# Patient Record
Sex: MALE | Race: WHITE | Employment: OTHER | URBAN - METROPOLITAN AREA
[De-identification: names, ages, dates, MRNs, and addresses within clinical notes are randomized per-mention and may not be internally consistent; named-entity substitution may affect disease eponyms.]

---

## 2019-12-03 ENCOUNTER — HOSPITAL ENCOUNTER (OUTPATIENT)
Dept: RADIATION ONCOLOGY | Age: 84
Discharge: HOME | End: 2019-12-03
Payer: MEDICARE

## 2019-12-03 VITALS
RESPIRATION RATE: 18 BRPM | OXYGEN SATURATION: 91 % | SYSTOLIC BLOOD PRESSURE: 136 MMHG | DIASTOLIC BLOOD PRESSURE: 78 MMHG | WEIGHT: 192 LBS | HEART RATE: 99 BPM | TEMPERATURE: 98.5 F

## 2019-12-03 PROBLEM — I63.9 CEREBROVASCULAR ACCIDENT (CVA) (HCC): Status: ACTIVE | Noted: 2019-09-16

## 2019-12-03 PROBLEM — I48.91 ATRIAL FIBRILLATION (HCC): Status: ACTIVE | Noted: 2019-09-13

## 2019-12-03 PROBLEM — Z79.01 LONG TERM (CURRENT) USE OF ANTICOAGULANTS: Status: ACTIVE | Noted: 2019-09-13

## 2019-12-03 PROBLEM — R73.01 IMPAIRED FASTING GLUCOSE: Status: ACTIVE | Noted: 2019-06-20

## 2019-12-03 PROBLEM — N47.8 REDUNDANT PREPUCE AND PHIMOSIS: Status: ACTIVE | Noted: 2019-12-03

## 2019-12-03 PROBLEM — C85.80: Status: ACTIVE | Noted: 2019-10-07

## 2019-12-03 PROBLEM — B35.4 DERMATOPHYTOSIS, BODY: Status: ACTIVE | Noted: 2019-12-03

## 2019-12-03 PROBLEM — R97.20 ELEVATED PSA: Status: ACTIVE | Noted: 2017-03-27

## 2019-12-03 PROBLEM — L57.0 ACTINIC KERATOSIS: Status: ACTIVE | Noted: 2019-12-03

## 2019-12-03 PROBLEM — N28.9 RENAL IMPAIRMENT: Status: ACTIVE | Noted: 2018-06-18

## 2019-12-03 PROBLEM — E78.2 MIXED HYPERLIPIDEMIA: Status: ACTIVE | Noted: 2017-03-06

## 2019-12-03 PROBLEM — N47.1 REDUNDANT PREPUCE AND PHIMOSIS: Status: ACTIVE | Noted: 2019-12-03

## 2019-12-03 PROBLEM — I10 BENIGN ESSENTIAL HYPERTENSION: Status: ACTIVE | Noted: 2019-12-03

## 2019-12-03 PROBLEM — R47.01 APHASIA: Status: ACTIVE | Noted: 2019-09-22

## 2019-12-03 PROBLEM — D64.9 ANEMIA: Status: ACTIVE | Noted: 2019-06-20

## 2019-12-03 PROBLEM — N47.1 PHIMOSIS: Status: ACTIVE | Noted: 2017-03-27

## 2019-12-03 PROBLEM — Z95.3 S/P TAVR (TRANSCATHETER AORTIC VALVE REPLACEMENT), BIOPROSTHETIC: Status: ACTIVE | Noted: 2019-10-14

## 2019-12-03 PROBLEM — Z85.828 HISTORY OF SQUAMOUS CELL CARCINOMA OF SKIN: Status: ACTIVE | Noted: 2018-06-18

## 2019-12-03 PROCEDURE — 99213 OFFICE O/P EST LOW 20 MIN: CPT | Performed by: RADIOLOGY

## 2019-12-03 RX ORDER — NIACIN 1000 MG/1
1000 TABLET, EXTENDED RELEASE ORAL DAILY
Refills: 3 | COMMUNITY
Start: 2019-11-24

## 2019-12-03 RX ORDER — ESCITALOPRAM OXALATE 10 MG/1
10 TABLET ORAL DAILY
COMMUNITY

## 2019-12-03 RX ORDER — ATORVASTATIN CALCIUM 40 MG/1
40 TABLET, FILM COATED ORAL DAILY
Refills: 2 | COMMUNITY
Start: 2019-11-24

## 2019-12-16 ENCOUNTER — HOSPITAL ENCOUNTER (OUTPATIENT)
Dept: RADIATION ONCOLOGY | Age: 84
Discharge: HOME | End: 2019-12-16
Attending: RADIOLOGY
Payer: MEDICARE

## 2019-12-16 DIAGNOSIS — C85.80: Primary | ICD-10-CM

## 2019-12-16 LAB
BUN BLDV-MCNC: 31 MG/DL (ref 8–23)
CREAT SERPL-MCNC: 1.2 MG/DL (ref 0.7–1.2)
GFR AFRICAN AMERICAN: >60 ML/MIN
GFR NON-AFRICAN AMERICAN: 58 ML/MIN
GFR SERPL CREATININE-BSD FRML MDRD: ABNORMAL ML/MIN/{1.73_M2}
GFR SERPL CREATININE-BSD FRML MDRD: ABNORMAL ML/MIN/{1.73_M2}

## 2019-12-16 PROCEDURE — 36415 COLL VENOUS BLD VENIPUNCTURE: CPT

## 2019-12-16 PROCEDURE — 84520 ASSAY OF UREA NITROGEN: CPT

## 2019-12-16 PROCEDURE — 82565 ASSAY OF CREATININE: CPT

## 2019-12-16 PROCEDURE — 77334 RADIATION TREATMENT AID(S): CPT | Performed by: RADIOLOGY

## 2019-12-20 ENCOUNTER — HOSPITAL ENCOUNTER (OUTPATIENT)
Dept: RADIATION ONCOLOGY | Age: 84
Discharge: HOME | End: 2019-12-20
Attending: RADIOLOGY
Payer: MEDICARE

## 2019-12-20 PROCEDURE — 77301 RADIOTHERAPY DOSE PLAN IMRT: CPT | Performed by: RADIOLOGY

## 2019-12-20 PROCEDURE — 77300 RADIATION THERAPY DOSE PLAN: CPT | Performed by: RADIOLOGY

## 2019-12-20 PROCEDURE — 77338 DESIGN MLC DEVICE FOR IMRT: CPT | Performed by: RADIOLOGY

## 2019-12-30 ENCOUNTER — HOSPITAL ENCOUNTER (OUTPATIENT)
Dept: RADIATION ONCOLOGY | Age: 84
Discharge: HOME | End: 2019-12-30
Attending: RADIOLOGY
Payer: MEDICARE

## 2019-12-30 ENCOUNTER — APPOINTMENT (OUTPATIENT)
Dept: RADIATION ONCOLOGY | Age: 84
End: 2019-12-30
Attending: RADIOLOGY
Payer: MEDICARE

## 2019-12-30 PROCEDURE — 77386 HC NTSTY MODUL RAD TX DLVR CPLX: CPT | Performed by: RADIOLOGY

## 2019-12-31 ENCOUNTER — HOSPITAL ENCOUNTER (OUTPATIENT)
Dept: RADIATION ONCOLOGY | Age: 84
Discharge: HOME | End: 2019-12-31
Payer: MEDICARE

## 2019-12-31 ENCOUNTER — HOSPITAL ENCOUNTER (OUTPATIENT)
Dept: RADIATION ONCOLOGY | Age: 84
Discharge: HOME | End: 2019-12-31
Attending: RADIOLOGY
Payer: MEDICARE

## 2019-12-31 VITALS
TEMPERATURE: 97.4 F | HEART RATE: 55 BPM | WEIGHT: 193.8 LBS | DIASTOLIC BLOOD PRESSURE: 77 MMHG | SYSTOLIC BLOOD PRESSURE: 130 MMHG | RESPIRATION RATE: 16 BRPM

## 2019-12-31 PROCEDURE — 77386 HC NTSTY MODUL RAD TX DLVR CPLX: CPT | Performed by: RADIOLOGY

## 2019-12-31 NOTE — PROGRESS NOTES
[address]            Radiation Oncology          [address] [facility]          [place] [address], [address]        Sumaya Quintana: [phone]        F: [phone]       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 RADIATION THERAPY COURSE:   Treatment Site: Left neck   Actual Dose: 400 cGy  Total Planned Dose: 2400 cGy  Treatment technique: IMRT  Therapy imaging monitoring: CBCT daily  Concurrent Chemotherapy: None    SUBJECTIVE:   Patient is doing well. Denies dysphagia, xerostomia, changes in taste, pain with swallowing. OBJECTIVE:   ECO Symptomatic, <50% in bed during the day    VITAL SIGNS: There were no vitals taken for this visit. Wt Readings from Last 5 Encounters:   19 192 lb (87.1 kg)     GENERAL:  General appearance is that of a well-nourished, well-developed in no apparent distress. SKIN: No erythema or desquamation. MEDICATIONS:    Current Outpatient Medications:     apixaban (ELIQUIS) 5 MG TABS tablet, Take 5 mg by mouth daily, Disp: , Rfl:     atorvastatin (LIPITOR) 40 MG tablet, Take 40 mg by mouth daily, Disp: , Rfl: 2    escitalopram (LEXAPRO) 10 MG tablet, Take 10 mg by mouth daily, Disp: , Rfl:     metoprolol tartrate (LOPRESSOR) 25 MG tablet, Take 25 mg by mouth daily, Disp: , Rfl: 0    niacin (NIASPAN) 1000 MG extended release tablet, Take 1,000 mg by mouth daily, Disp: , Rfl: 3    Multiple Vitamins-Minerals (CENTRUM ADULTS PO), Take 1 tablet by mouth daily, Disp: , Rfl:       ASSESSMENT PLAN:   Treatment setup and plan reviewed. Port images/CBCT images reviewed. Appropriate laboratory work was reviewed.  Treatment side

## 2019-12-31 NOTE — PROGRESS NOTES
Luciano Mcwilliams  12/31/2019  Wt Readings from Last 3 Encounters:   12/31/19 193 lb 12.8 oz (87.9 kg)   12/03/19 192 lb (87.1 kg)     There is no height or weight on file to calculate BMI. Treatment Area: Head and neck     Patient was seen today for weekly visit. Comfort Alteration  Fatigue: Mild    Mucous Membrane Alteration  Mucositis Due to Radiation: No  Thrush: No  Voice Changes: No    Nutritional Alteration  Anorexia: poor but normal for pt  Nausea: No   Vomiting: No     Ventilation Alteration  Cough:No    Skin Alteration   Sensation: No concerns     Radiation Dermatitis:  Intact [x]     Erythema  []     Discoloration  []     Rash []     Dry desquamation  []     Moist desquamation []       Emotional  Coping: effective      Injury, potential bleeding or infection:  Skin care reviewed     No results found for: WBC, PLT      /77   Pulse 55   Temp 97.4 °F (36.3 °C) (Oral)   Resp 16   Wt 193 lb 12.8 oz (87.9 kg)   Patient Currently in Pain: No               Assessment/Plan: Patient was seen today for weekly visit. Pt recently started treatment. No concerns or issues today. Dr. Tiffany Stauffer updated and examined pt. He will continue with treatment as planned.      Belkis Cowan

## 2020-01-01 ENCOUNTER — APPOINTMENT (OUTPATIENT)
Dept: RADIATION ONCOLOGY | Age: 85
End: 2020-01-01
Attending: RADIOLOGY
Payer: MEDICARE

## 2020-01-02 ENCOUNTER — HOSPITAL ENCOUNTER (OUTPATIENT)
Dept: RADIATION ONCOLOGY | Age: 85
Discharge: HOME | End: 2020-01-02
Attending: RADIOLOGY
Payer: MEDICARE

## 2020-01-02 PROCEDURE — 77386 HC NTSTY MODUL RAD TX DLVR CPLX: CPT | Performed by: RADIOLOGY

## 2020-01-03 ENCOUNTER — HOSPITAL ENCOUNTER (OUTPATIENT)
Dept: RADIATION ONCOLOGY | Age: 85
Discharge: HOME | End: 2020-01-03
Attending: RADIOLOGY
Payer: MEDICARE

## 2020-01-03 PROCEDURE — 77386 HC NTSTY MODUL RAD TX DLVR CPLX: CPT | Performed by: RADIOLOGY

## 2020-01-06 ENCOUNTER — HOSPITAL ENCOUNTER (OUTPATIENT)
Dept: RADIATION ONCOLOGY | Age: 85
Discharge: HOME | End: 2020-01-06
Attending: RADIOLOGY
Payer: MEDICARE

## 2020-01-06 PROCEDURE — 77386 HC NTSTY MODUL RAD TX DLVR CPLX: CPT | Performed by: RADIOLOGY

## 2020-01-07 ENCOUNTER — HOSPITAL ENCOUNTER (OUTPATIENT)
Dept: RADIATION ONCOLOGY | Age: 85
Discharge: HOME | End: 2020-01-07
Attending: RADIOLOGY
Payer: MEDICARE

## 2020-01-07 PROCEDURE — 77386 HC NTSTY MODUL RAD TX DLVR CPLX: CPT | Performed by: RADIOLOGY

## 2020-01-08 ENCOUNTER — HOSPITAL ENCOUNTER (OUTPATIENT)
Dept: RADIATION ONCOLOGY | Age: 85
Discharge: HOME | End: 2020-01-08
Attending: RADIOLOGY
Payer: MEDICARE

## 2020-01-08 VITALS
TEMPERATURE: 98 F | WEIGHT: 194 LBS | HEART RATE: 58 BPM | SYSTOLIC BLOOD PRESSURE: 163 MMHG | RESPIRATION RATE: 18 BRPM | DIASTOLIC BLOOD PRESSURE: 68 MMHG | OXYGEN SATURATION: 98 %

## 2020-01-08 PROCEDURE — 77386 HC NTSTY MODUL RAD TX DLVR CPLX: CPT | Performed by: RADIOLOGY

## 2020-01-08 PROCEDURE — 77336 RADIATION PHYSICS CONSULT: CPT | Performed by: RADIOLOGY

## 2020-01-08 NOTE — PROGRESS NOTES
[facility]            Radiation Oncology          [address] [facility]          [address], [address]        Joanie Moreno: [phone]        F: [phone]       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     RADIATION THERAPY COURSE:   Treatment Site: Left neck   Actual Dose: 1400 cGy  Total Planned Dose: 2400 cGy  Treatment technique: IMRT  Therapy imaging monitoring: CBCT daily  Concurrent Chemotherapy: None     SUBJECTIVE:   Patient is doing well. Denies dysphagia, xerostomia, changes in taste, pain with swallowing. OBJECTIVE:   ECO Asymptomatic    VITAL SIGNS: BP (!) 163/68   Pulse 58   Temp 98 °F (36.7 °C) (Oral)   Resp 18   Wt 194 lb (88 kg)   SpO2 98%   Wt Readings from Last 5 Encounters:   20 194 lb (88 kg)   19 193 lb 12.8 oz (87.9 kg)   19 192 lb (87.1 kg)     GENERAL:  General appearance is that of a well-nourished, well-developed in no apparent distress. SKIN: No erythema or desquamation.     LABS:  No results found for: WBC, NEUTROABS, HGB, PLT  No results found for: , CEA  No results found for: PSA    MEDICATIONS:    Current Outpatient Medications:     apixaban (ELIQUIS) 5 MG TABS tablet, Take 5 mg by mouth daily, Disp: , Rfl:     atorvastatin (LIPITOR) 40 MG tablet, Take 40 mg by mouth daily, Disp: , Rfl: 2    escitalopram (LEXAPRO) 10 MG tablet, Take 10 mg by mouth daily, Disp: , Rfl:     metoprolol tartrate (LOPRESSOR) 25 MG tablet, Take 25 mg by mouth daily, Disp: , Rfl: 0    niacin (NIASPAN) 1000 MG extended release tablet, Take 1,000 mg by mouth daily, Disp: , Rfl: 3    Multiple Vitamins-Minerals (CENTRUM

## 2020-01-08 NOTE — PROGRESS NOTES
Jerica De Leon  1/8/2020  Wt Readings from Last 3 Encounters:   01/08/20 194 lb (88 kg)   12/31/19 193 lb 12.8 oz (87.9 kg)   12/03/19 192 lb (87.1 kg)     There is no height or weight on file to calculate BMI. Treatment Area: neck     Patient was seen today for weekly visit. Comfort Alteration  Fatigue: Mild    Mucous Membrane Alteration  Mucositis Due to Radiation: No  Thrush: No  Voice Changes: No    Nutritional Alteration  Anorexia: Yes   Nausea: No   Vomiting: No     Ventilation Alteration  Cough:No    Skin Alteration   Sensation:intact     Radiation Dermatitis:  Intact [x]     Erythema  []     Discoloration  []     Rash []     Dry desquamation  []     Moist desquamation []       Emotional  Coping: effective      Injury, potential bleeding or infection: n/a     No results found for: WBC, PLT      BP (!) 163/68   Pulse 58   Temp 98 °F (36.7 °C) (Oral)   Resp 18   Wt 194 lb (88 kg)   SpO2 98%   Patient Currently in Pain: Denies               Assessment/Plan: Patient was seen today for weekly visit. Dr Vipul Jefferson notified and examined pt. No new orders received.        Pati Fuller

## 2020-01-09 ENCOUNTER — HOSPITAL ENCOUNTER (OUTPATIENT)
Dept: RADIATION ONCOLOGY | Age: 85
Discharge: HOME | End: 2020-01-09
Attending: RADIOLOGY
Payer: MEDICARE

## 2020-01-09 PROCEDURE — 77386 HC NTSTY MODUL RAD TX DLVR CPLX: CPT | Performed by: RADIOLOGY

## 2020-01-10 ENCOUNTER — HOSPITAL ENCOUNTER (OUTPATIENT)
Dept: RADIATION ONCOLOGY | Age: 85
Discharge: HOME | End: 2020-01-10
Attending: RADIOLOGY
Payer: MEDICARE

## 2020-01-10 PROCEDURE — 77386 HC NTSTY MODUL RAD TX DLVR CPLX: CPT | Performed by: RADIOLOGY

## 2020-01-13 ENCOUNTER — HOSPITAL ENCOUNTER (OUTPATIENT)
Dept: RADIATION ONCOLOGY | Age: 85
Discharge: HOME | End: 2020-01-13
Attending: RADIOLOGY
Payer: MEDICARE

## 2020-01-13 PROCEDURE — 77386 HC NTSTY MODUL RAD TX DLVR CPLX: CPT | Performed by: RADIOLOGY

## 2020-01-14 ENCOUNTER — HOSPITAL ENCOUNTER (OUTPATIENT)
Dept: RADIATION ONCOLOGY | Age: 85
Discharge: HOME | End: 2020-01-14
Attending: RADIOLOGY
Payer: MEDICARE

## 2020-01-14 PROCEDURE — 77386 HC NTSTY MODUL RAD TX DLVR CPLX: CPT | Performed by: RADIOLOGY

## 2020-01-15 ENCOUNTER — HOSPITAL ENCOUNTER (OUTPATIENT)
Dept: RADIATION ONCOLOGY | Age: 85
Discharge: HOME | End: 2020-01-15
Attending: RADIOLOGY
Payer: MEDICARE

## 2020-01-15 VITALS
SYSTOLIC BLOOD PRESSURE: 146 MMHG | HEART RATE: 78 BPM | DIASTOLIC BLOOD PRESSURE: 75 MMHG | WEIGHT: 192 LBS | OXYGEN SATURATION: 98 % | RESPIRATION RATE: 16 BRPM | TEMPERATURE: 98.6 F

## 2020-01-15 LAB — PROSTATE SPECIFIC ANTIGEN: 43.68 UG/L

## 2020-01-15 PROCEDURE — 77386 HC NTSTY MODUL RAD TX DLVR CPLX: CPT | Performed by: RADIOLOGY

## 2020-01-15 PROCEDURE — 36415 COLL VENOUS BLD VENIPUNCTURE: CPT

## 2020-01-15 PROCEDURE — 77336 RADIATION PHYSICS CONSULT: CPT | Performed by: RADIOLOGY

## 2020-01-15 PROCEDURE — 84153 ASSAY OF PSA TOTAL: CPT

## 2020-01-15 NOTE — PROGRESS NOTES
[address]            Radiation Oncology          [address] [facility]          [address], [address]        Jasmin [phone]        F: [phone]       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     RADIATION THERAPY COURSE:   Treatment Site: Left neck   Actual Dose: 2400 cGy  Total Planned Dose: 2400 cGy  Treatment technique: IMRT  Therapy imaging monitoring: CBCT daily  Concurrent Chemotherapy: None     SUBJECTIVE:   Patient reports pain with swallowing. Denies xerostomia or change in taste. No other complaints. OBJECTIVE:   ECO Symptomatic but completely ambulatory    VITAL SIGNS: BP (!) 146/75   Pulse 78   Temp 98.6 °F (37 °C) (Oral)   Resp 16   Wt 192 lb (87.1 kg)   SpO2 98%   Wt Readings from Last 5 Encounters:   01/15/20 192 lb (87.1 kg)   20 194 lb (88 kg)   19 193 lb 12.8 oz (87.9 kg)   19 192 lb (87.1 kg)     GENERAL:  General appearance is that of a well-nourished, well-developed in no apparent distress. HEENT: Normocephalic, atraumatic, EOMI, moist mucosa, no erythema  SKIN: No erythema or desquamation.     MEDICATIONS:    Current Outpatient Medications:     Magic Mouthwash (MIRACLE MOUTHWASH), Swish and swallow 5 mLs 4 times daily as needed for Irritation or Pain, Disp: , Rfl:     apixaban (ELIQUIS) 5 MG TABS tablet, Take 5 mg by mouth daily, Disp: , Rfl:     atorvastatin (LIPITOR) 40 MG tablet, Take 40 mg by mouth daily, Disp: , Rfl: 2    escitalopram (LEXAPRO) 10 MG tablet, Take 10 mg by mouth daily, Disp: , Rfl:     metoprolol tartrate (LOPRESSOR) 25 MG tablet, Take 25 mg by mouth daily, Disp: , Rfl: 0  

## 2020-01-15 NOTE — PROGRESS NOTES
Jun Marlyn  1/15/2020  Wt Readings from Last 3 Encounters:   01/15/20 192 lb (87.1 kg)   01/08/20 194 lb (88 kg)   12/31/19 193 lb 12.8 oz (87.9 kg)     There is no height or weight on file to calculate BMI. Treatment Area:Right neck     Patient was seen today for weekly visit. Comfort Alteration  Fatigue: Mild    Mucous Membrane Alteration  Mucositis Due to Radiation: No  Thrush: No  Voice Changes: No    Nutritional Alteration  Anorexia: No   Nausea: No   Vomiting: No     Ventilation Alteration  Cough:No    Skin Alteration   Sensation:intact     Radiation Dermatitis:  Intact []     Erythema  [x]     Discoloration  []     Rash []     Dry desquamation  []     Moist desquamation []       Emotional  Coping: effective      Injury, potential bleeding or infection: oral care reviewed     No results found for: WBC, PLT      BP (!) 146/75   Pulse 78   Temp 98.6 °F (37 °C) (Oral)   Resp 16   Wt 192 lb (87.1 kg)   SpO2 98%   Patient Currently in Pain: Yes               Assessment/Plan: Patient was seen today for weekly visit. He reports pain with swallowing. Dr Mahad Almanzar notified and examined pt. Magic Mouthwash called into [facility] in [place].  Pt will f/u in 1 month        Bakari Mcneil

## 2020-01-17 ENCOUNTER — CLINICAL DOCUMENTATION (OUTPATIENT)
Dept: RADIATION ONCOLOGY | Age: 85
End: 2020-01-17

## 2020-01-17 NOTE — PROGRESS NOTES
evaluation. Patient will come back to see me for his first posttreatment follow-up in 1 month.       Electronically signed by Carlos Patel MD on 1/17/2020 at 8:15 AM

## 2020-02-25 ENCOUNTER — HOSPITAL ENCOUNTER (OUTPATIENT)
Dept: RADIATION ONCOLOGY | Age: 85
Discharge: HOME | End: 2020-02-25
Attending: RADIOLOGY
Payer: MEDICARE

## 2020-02-25 VITALS
DIASTOLIC BLOOD PRESSURE: 75 MMHG | SYSTOLIC BLOOD PRESSURE: 152 MMHG | OXYGEN SATURATION: 98 % | TEMPERATURE: 98 F | HEART RATE: 60 BPM | RESPIRATION RATE: 18 BRPM | WEIGHT: 200 LBS

## 2020-02-25 PROCEDURE — 99212 OFFICE O/P EST SF 10 MIN: CPT | Performed by: RADIOLOGY

## 2020-02-25 NOTE — PROGRESS NOTES
Mary Lou Chiu  2/25/2020  3:07 PM      Vitals:    02/25/20 1500   BP: (!) 152/75   Pulse: 60   Resp: 18   Temp: 98 °F (36.7 °C)   SpO2: 98%    :  Patient Currently in Pain: Denies             Wt Readings from Last 1 Encounters:   02/25/20 200 lb (90.7 kg)                Current Outpatient Medications:     Magic Mouthwash (MIRACLE MOUTHWASH), Swish and swallow 5 mLs 4 times daily as needed for Irritation or Pain, Disp: , Rfl:     apixaban (ELIQUIS) 5 MG TABS tablet, Take 5 mg by mouth daily, Disp: , Rfl:     atorvastatin (LIPITOR) 40 MG tablet, Take 40 mg by mouth daily, Disp: , Rfl: 2    escitalopram (LEXAPRO) 10 MG tablet, Take 10 mg by mouth daily, Disp: , Rfl:     metoprolol tartrate (LOPRESSOR) 25 MG tablet, Take 25 mg by mouth daily, Disp: , Rfl: 0    niacin (NIASPAN) 1000 MG extended release tablet, Take 1,000 mg by mouth daily, Disp: , Rfl: 3    Multiple Vitamins-Minerals (CENTRUM ADULTS PO), Take 1 tablet by mouth daily, Disp: , Rfl:     Immunizations:    Influenza status:    [x]   Current   []   Patient declined    Pneumococcal status:  [x]   Current  []   Patient declined    Smoking Status:    [] Smoker - PPD:  Smoking cessation education: Provided []   Declined []    [] Nonsmoker - Quit Date:               [x] Never a smoker           Cancer Screening:  Colonoscopy [] Current [x] Not current   [] Not current, but scheduled   [] NA  Mammogram [] Current [x] Not current   [] Not current, but scheduled   [] NA  Prostate [x] Current [] Not current   [] Not current, but scheduled   [] NA  PAP/Pelvic [] Current [] Not current   [] Not current, but scheduled   [x] NA  Skin  [] Current  [x] Not current   [] Not current, but scheduled   [] NA    Hormone:  Lupron []   Last dose given:           Next dose due:   Eligard []   Last dose given:           Next dose due:   Aromatase Inhibitors []   Medication name:   N/A:  [x]         FALLS RISK SCREEN  Instructions:  Assess the patient and enter the appropriate indicators that are present for fall risk identification. Total the numbers entered and assign a fall risk score from Table 2.  Reassess patient at a minimum every 12 weeks or with status change. Assessment   Date  2/25/2020     1. Mental Ability: confusion/cognitively impaired 0     2. Elimination Issues: incontinence, frequency 0       3. Ambulatory: use of assistive devices (walker, cane, off-loading devices),        attached to equipment (IV pole, oxygen) 0     4. Sensory Limitations: dizziness, vertigo, impaired vision 0     5. Age less than 65        0     6. Age 72 or greater 1     7. Medication: diuretics, strong analgesics, hypnotics, sedatives,        antihypertensive agents 3   8. Falls:  recent history of falls within the last 3 months (not to include slipping or        tripping) 0   TOTAL 4    If score of 4 or greater was education given? Yes           TABLE 2   Risk Score Risk Level Plan of Care   0-3 Little or  No Risk 1. Provide assistance as indicated for ambulation activities  2. Reorient confused/cognitively impaired patient  3. Chair/bed in low position, stretcher/bed with siderails up except when performing patient care activities  5. Educate patient/family/caregiver on falls prevention  6.  Reassess in 12 weeks or with any noted change in patient condition which places them at a risk for a fall   4-6 Moderate Risk 1. Provide assistance as indicated for ambulation activities  2. Reorient confused/cognitively impaired patient  3. Chair/bed in low position, stretcher/bed with siderails up except when performing patient care activities  4. Educate patient/family/caregiver on falls prevention     7 or   Higher High Risk 1. Place patient in easily observable treatment room  2. Patient attended at all times by family member or staff  3. Provide assistance as indicated for ambulation activities  4. Reorient confused/cognitively impaired patient  5.   Chair/bed in low position, stretcher/bed with siderails up except when performing patient care activities  6. Educate patient/family/caregiver on falls prevention         PLAN: Patient is seen today in follow up. He reports no concerns at this time. Dr Kathryn Courtney notified and examined pt. Pt has decided not to f/u with urology at this time. Pt to f/u in RO in 3 months.          John Paul Felix

## 2020-03-07 ENCOUNTER — HOSPITAL ENCOUNTER (EMERGENCY)
Age: 85
Discharge: HOME | End: 2020-03-07
Attending: EMERGENCY MEDICINE
Payer: MEDICARE

## 2020-03-07 ENCOUNTER — APPOINTMENT (OUTPATIENT)
Dept: CT IMAGING | Age: 85
End: 2020-03-07
Payer: MEDICARE

## 2020-03-07 VITALS
OXYGEN SATURATION: 99 % | SYSTOLIC BLOOD PRESSURE: 129 MMHG | WEIGHT: 194 LBS | HEIGHT: 70 IN | DIASTOLIC BLOOD PRESSURE: 59 MMHG | BODY MASS INDEX: 27.77 KG/M2 | RESPIRATION RATE: 14 BRPM | TEMPERATURE: 97.7 F | HEART RATE: 55 BPM

## 2020-03-07 PROCEDURE — 90471 IMMUNIZATION ADMIN: CPT | Performed by: EMERGENCY MEDICINE

## 2020-03-07 PROCEDURE — 72125 CT NECK SPINE W/O DYE: CPT

## 2020-03-07 PROCEDURE — 70450 CT HEAD/BRAIN W/O DYE: CPT

## 2020-03-07 PROCEDURE — 99283 EMERGENCY DEPT VISIT LOW MDM: CPT

## 2020-03-07 PROCEDURE — 6370000000 HC RX 637 (ALT 250 FOR IP): Performed by: EMERGENCY MEDICINE

## 2020-03-07 PROCEDURE — 12011 RPR F/E/E/N/L/M 2.5 CM/<: CPT

## 2020-03-07 PROCEDURE — 90715 TDAP VACCINE 7 YRS/> IM: CPT | Performed by: EMERGENCY MEDICINE

## 2020-03-07 PROCEDURE — 6360000002 HC RX W HCPCS: Performed by: EMERGENCY MEDICINE

## 2020-03-07 RX ORDER — CEPHALEXIN 500 MG/1
500 CAPSULE ORAL 4 TIMES DAILY
Qty: 28 CAPSULE | Refills: 0 | Status: SHIPPED | OUTPATIENT
Start: 2020-03-07 | End: 2020-03-14

## 2020-03-07 RX ADMIN — SILVER NITRATE APPLICATORS 1 EACH: 25; 75 STICK TOPICAL at 10:49

## 2020-03-07 RX ADMIN — TETANUS TOXOID, REDUCED DIPHTHERIA TOXOID AND ACELLULAR PERTUSSIS VACCINE, ADSORBED 0.5 ML: 5; 2.5; 8; 8; 2.5 SUSPENSION INTRAMUSCULAR at 11:22

## 2020-03-07 NOTE — ED PROVIDER NOTES
[address] [facility]  [address] [facility] [address] [facility] [address]  Phone: [phone]  Fax: [phone]        Pt Name: Jun Cline  MRN: [id number]  [place] 1935  Date of evaluation: 3/7/20      CHIEF COMPLAINT     Chief Complaint   Patient presents with    Laceration     right ear laceration that won't quit bleeding, on blood thinners         HISTORY OF PRESENT ILLNESS  (Location/Symptom, Timing/Onset, Context/Setting, Quality, Duration, Modifying Factors, Severity.)    Jun Cline is a 81 y.o. male who presents with a right ear laceration. Patient was going out into the garage to play some garbage away when he accidentally struck his right ear causing a laceration to the outer aspect of the right ear no loss of consciousness does have bleeding and he is on blood thinners denies any overt headache or neck pain he does have some expressive aphasia at times secondary to a stroke that he had suffered previously last tetanus is unknown      REVIEW OF SYSTEMS    (2-9 systems for level 4, 10 or more for level 5)     Review of Systems   Skin: Positive for wound. All other systems reviewed and are negative. PAST MEDICAL HISTORY    has a past medical history of Heart attack ([address] High cholesterol, Hypertension, and Stroke ([address] SURGICAL HISTORY      has a past surgical history that includes Arterial bypass surgry (2004); Cardiac valve replacement (09/2019); Cardiac catheterization (2003); and Mouth Biopsy (09/2019).     CURRENTMEDICATIONS       Previous Medications    APIXABAN (ELIQUIS) 5 MG TABS TABLET    Take 5 mg by mouth daily    ATORVASTATIN (LIPITOR) 40 MG TABLET    Take 40 mg by mouth daily    ESCITALOPRAM (LEXAPRO) 10 MG TABLET    Take 10 mg by mouth daily    MAGIC MOUTHWASH (MIRACLE MOUTHWASH)    Swish and swallow 5 mLs 4 times daily as needed for Irritation or Pain    METOPROLOL TARTRATE (LOPRESSOR) 25 MG TABLET    Take 25 mg by mouth daily    MULTIPLE VITAMINS-MINERALS (CENTRUM ADULTS PO)    Take 1 tablet by mouth daily    NIACIN (NIASPAN) 1000 MG EXTENDED RELEASE TABLET    Take 1,000 mg by mouth daily       ALLERGIES     has No Known Allergies. FAMILY HISTORY     He indicated that the status of his mother is unknown. He reported the following about his father: \"jaw tumor\" . family history includes Colon Cancer in his mother; Other in his father. SOCIAL HISTORY      reports that he has never smoked. He has never used smokeless tobacco. He reports previous alcohol use. He reports that he does not use drugs. PHYSICAL EXAM    (up to 7 for level 4, 8 or more for level 5)   INITIAL VITALS:  height is 5' 10\" (1.778 m) and weight is 88 kg (194 lb). His oral temperature is 97.7 °F (36.5 °C). His blood pressure is 129/59 (abnormal) and his pulse is 55. His respiration is 14 and oxygen saturation is 99%. Physical Exam  Vitals signs and nursing note reviewed. Constitutional:       Appearance: Normal appearance. HENT:      Head:      Comments: Laceration to the right outer ear does have some active oozing of blood     Right Ear: Tympanic membrane normal.      Left Ear: Tympanic membrane normal.   Eyes:      Pupils: Pupils are equal, round, and reactive to light. Neck:      Musculoskeletal: Normal range of motion and neck supple. Musculoskeletal: Normal range of motion. Skin:     Comments: Less than 1 cm laceration to the outer aspect of the right ear   Neurological:      General: No focal deficit present. Mental Status: He is alert.       Comments: GCS of 15 with no focal deficits appreciated         DIFFERENTIAL DIAGNOSIS/ MDM:     The patient presents with a laceration to the right outer ear I did discuss the risks and benefits of a CT of the head and CT of the C-spine given the injury and the family would like to proceed with the CT scans so we will get a CT head CT C-spine update the patient's tetanus    DIAGNOSTIC RESULTS         RADIOLOGY:  Non-plain film images such as CT, Ultrasound and MRI are read by the radiologist. Plain radiographic images are visualized and the radiologist interpretations are reviewed as follows:         Interpretation per the Radiologist below, if available at the time of this note:    CT Cervical Spine WO Contrast (Final result)   Result time 03/07/20 13:03:52   Final result by Shama Chris MD (03/07/20 13:03:52)                Impression:    1. No acute findings in the cervical spine. 2. Severe cervical spine degenerative changes. Narrative:    EXAMINATION:  CT OF THE CERVICAL SPINE WITHOUT CONTRAST    3/7/2020 11:32 am    TECHNIQUE:  CT of the cervical spine was performed without the administration of  intravenous contrast. Multiplanar reformatted images are provided for review. Dose modulation, iterative reconstruction, and/or weight based adjustment of  the mA/kV was utilized to reduce the radiation dose to as low as reasonably  achievable. COMPARISON:  None    HISTORY:  ORDERING SYSTEM PROVIDED HISTORY: fall  TECHNOLOGIST PROVIDED HISTORY:  fall  Reason for Exam: Right sided ear laceration, fall  Acuity: Acute  Type of Exam: Initial  Mechanism of Injury: Fall    FINDINGS:  BONES/ALIGNMENT:  No acute fracture.  Straightening of cervical lordosis.  No  spondylolisthesis. DEGENERATIVE CHANGES:  Severe degenerative changes of the anterior  atlantoaxial joint.  Moderate to severe degenerative disc disease.  Severe  facet arthropathy. SOFT TISSUES:  Normal appearance of the prevertebral soft tissues.  Atrophic  thyroid.                    CT Head WO Contrast (Final result)   Result time 03/07/20 12:44:05   Final result by Shama Chris MD (03/07/20 12:44:05)                Impression:    1. No acute intracranial abnormality nor acute calvarial fracture. 2. Suspected subcutaneous contusion near the right ear. 3. Chronic changes as above.             Narrative:    EXAMINATION:  CT OF THE HEAD WITHOUT 129/59, Temp: 97.7 °F (36.5 °C), Pulse: 55, Resp: 14      RE-EVALUATION:  CT head and CT C-spine show no acute process tetanus was updated the 1/2 cm right ear laceration was repaired with two 5-0 nylon sutures I am recommending that he keep that area clean and dry may apply antibiotic ointment return to the ER for any further bleeding signs of infection any headache or other concerns otherwise he is to get a wound check by his family doctor and the sutures are to be removed in approximately 10 days  The patient presents with a closed head injury. The patient is neurologically intact. The presentation does not suggest a serious head injury. Signs and symptoms of a serious head injury have been discussed with the patient and caregiver, who will be vigilant for these. Concerns of repeat head injury have also been discussed. The patient has been observed adequately in the ED. Pt has been instructed to return to the ED if symptoms do not go away or worsen or change in any way. The patient understands that at this time there is no evidence for a more malignant underlying process, but the patient also understands that early in the process of an illness or injury, an emergency department workup can be falsely reassuring. Routine discharge counseling was given, and the patient understands that worsening, changing or persistent symptoms should prompt an immediate call or follow up with their primary physician or return to the emergency department. The importance of appropriate follow up was also discussed. I have reviewed the disposition diagnosis with the patient and or their family/guardian. I have answered their questions and given discharge instructions. They voiced understanding of these instructions and did not have any further questions or complaints.     PROCEDURES:  The patient noted to have an approximately one half to three-quarter centimeter laceration to the outer aspect of the right ear no hemotympanum we initially tried to cauterize the laceration but without success so the area was anesthetized with approximately 1/2 cc of 1% lidocaine with epi the wound was irrigated scrubbed two 5-0 nylon sutures were placed with good approximation of the wound edges initially had some slight oozing then with a pressure dressing and while waiting for the CT report no further bleeding is now appreciated the patient tolerated this well without any complications    FINAL IMPRESSION      1. Injury of head, initial encounter    2. Laceration of antihelix of right ear, initial encounter          DISPOSITION/PLAN   DISPOSITION Decision To Discharge 03/07/2020 01:18:37 PM      CONDITION ON DISPOSITION:   Improved - Stable    PATIENT REFERRED TO:  Jefry Higgins MD  [address]  [address] [address],[address] [place] [address]  [phone]    Call in 2 days        DISCHARGE MEDICATIONS:  New Prescriptions    CEPHALEXIN (KEFLEX) 500 MG CAPSULE    Take 1 capsule by mouth 4 times daily for 7 days       (Please note that portions of this note were completed with a voicerecognition program.  Efforts were made to edit the dictations but occasionally words are mis-transcribed.)    Garduno MD, F.A.C.E.P.   Attending Emergency Medicine Physician       Serg Blanchard MD  03/07/20 1914

## 2020-03-18 ENCOUNTER — APPOINTMENT (OUTPATIENT)
Dept: GENERAL RADIOLOGY | Age: 85
End: 2020-03-18
Payer: MEDICARE

## 2020-03-18 ENCOUNTER — APPOINTMENT (OUTPATIENT)
Dept: CT IMAGING | Age: 85
End: 2020-03-18
Payer: MEDICARE

## 2020-03-18 ENCOUNTER — HOSPITAL ENCOUNTER (EMERGENCY)
Age: 85
Discharge: ANOTHER HOSPITAL | End: 2020-03-18
Attending: EMERGENCY MEDICINE
Payer: MEDICARE

## 2020-03-18 VITALS
DIASTOLIC BLOOD PRESSURE: 54 MMHG | TEMPERATURE: 98.2 F | RESPIRATION RATE: 17 BRPM | WEIGHT: 194 LBS | BODY MASS INDEX: 27.77 KG/M2 | HEIGHT: 70 IN | OXYGEN SATURATION: 99 % | SYSTOLIC BLOOD PRESSURE: 124 MMHG | HEART RATE: 59 BPM

## 2020-03-18 LAB
ABSOLUTE EOS #: 0.4 K/UL (ref 0–0.4)
ABSOLUTE IMMATURE GRANULOCYTE: ABNORMAL K/UL (ref 0–0.3)
ABSOLUTE LYMPH #: 0.8 K/UL (ref 1–4.8)
ABSOLUTE MONO #: 0.5 K/UL (ref 0.1–1.2)
ALBUMIN SERPL-MCNC: 3.8 G/DL (ref 3.5–5.2)
ALBUMIN/GLOBULIN RATIO: 1.7 (ref 1–2.5)
ALP BLD-CCNC: 101 U/L (ref 40–129)
ALT SERPL-CCNC: 24 U/L (ref 5–41)
ANION GAP SERPL CALCULATED.3IONS-SCNC: 9 MMOL/L (ref 9–17)
AST SERPL-CCNC: 27 U/L
BASOPHILS # BLD: 1 % (ref 0–2)
BASOPHILS ABSOLUTE: 0 K/UL (ref 0–0.2)
BILIRUB SERPL-MCNC: 0.38 MG/DL (ref 0.3–1.2)
BILIRUBIN URINE: NEGATIVE
BNP INTERPRETATION: ABNORMAL
BUN BLDV-MCNC: 23 MG/DL (ref 8–23)
BUN/CREAT BLD: ABNORMAL (ref 9–20)
CALCIUM SERPL-MCNC: 9.6 MG/DL (ref 8.6–10.4)
CHLORIDE BLD-SCNC: 103 MMOL/L (ref 98–107)
CO2: 28 MMOL/L (ref 20–31)
COLOR: YELLOW
COMMENT UA: NORMAL
CREAT SERPL-MCNC: 1.23 MG/DL (ref 0.7–1.2)
DIFFERENTIAL TYPE: ABNORMAL
EOSINOPHILS RELATIVE PERCENT: 7 % (ref 1–4)
FIO2: ABNORMAL
GFR AFRICAN AMERICAN: >60 ML/MIN
GFR NON-AFRICAN AMERICAN: 56 ML/MIN
GFR SERPL CREATININE-BSD FRML MDRD: ABNORMAL ML/MIN/{1.73_M2}
GFR SERPL CREATININE-BSD FRML MDRD: ABNORMAL ML/MIN/{1.73_M2}
GLUCOSE BLD-MCNC: 123 MG/DL (ref 70–99)
GLUCOSE URINE: NEGATIVE
HCO3 VENOUS: 31.2 MMOL/L (ref 24–30)
HCT VFR BLD CALC: 39.7 % (ref 41–53)
HEMOGLOBIN: 13.1 G/DL (ref 13.5–17.5)
IMMATURE GRANULOCYTES: ABNORMAL %
KETONES, URINE: NEGATIVE
LEUKOCYTE ESTERASE, URINE: NEGATIVE
LYMPHOCYTES # BLD: 15 % (ref 24–44)
MCH RBC QN AUTO: 31.6 PG (ref 26–34)
MCHC RBC AUTO-ENTMCNC: 33.1 G/DL (ref 31–37)
MCV RBC AUTO: 95.6 FL (ref 80–100)
MONOCYTES # BLD: 10 % (ref 2–11)
MYOGLOBIN: 75 NG/ML (ref 28–72)
NEGATIVE BASE EXCESS, VEN: ABNORMAL (ref 0–2)
NITRITE, URINE: NEGATIVE
NRBC AUTOMATED: ABNORMAL PER 100 WBC
O2 DEVICE/FLOW/%: ABNORMAL
O2 SAT, VEN: 18 %
PATIENT TEMP: ABNORMAL
PCO2, VEN: 49 MM HG (ref 39–55)
PDW BLD-RTO: 14.8 % (ref 12.5–15.4)
PH UA: 6.5 (ref 5–8)
PH VENOUS: 7.41 (ref 7.32–7.42)
PLATELET # BLD: 139 K/UL (ref 140–450)
PLATELET ESTIMATE: ABNORMAL
PMV BLD AUTO: 8.1 FL (ref 6–12)
PO2, VEN: 15 MM HG (ref 30–50)
POC PCO2 TEMP: ABNORMAL MM HG
POC PH TEMP: ABNORMAL
POC PO2 TEMP: ABNORMAL MM HG
POSITIVE BASE EXCESS, VEN: 7 (ref 0–2)
POTASSIUM SERPL-SCNC: 5 MMOL/L (ref 3.7–5.3)
PRO-BNP: 471 PG/ML
PROTEIN UA: NEGATIVE
RBC # BLD: 4.15 M/UL (ref 4.5–5.9)
RBC # BLD: ABNORMAL 10*6/UL
SEG NEUTROPHILS: 67 % (ref 36–66)
SEGMENTED NEUTROPHILS ABSOLUTE COUNT: 3.7 K/UL (ref 1.8–7.7)
SODIUM BLD-SCNC: 140 MMOL/L (ref 135–144)
SPECIFIC GRAVITY UA: 1.01 (ref 1–1.03)
TOTAL CO2, VENOUS: 33 MMOL/L (ref 25–31)
TOTAL PROTEIN: 6 G/DL (ref 6.4–8.3)
TROPONIN INTERP: ABNORMAL
TROPONIN INTERP: ABNORMAL
TROPONIN T: ABNORMAL NG/ML
TROPONIN T: ABNORMAL NG/ML
TROPONIN, HIGH SENSITIVITY: 27 NG/L (ref 0–22)
TROPONIN, HIGH SENSITIVITY: 28 NG/L (ref 0–22)
TURBIDITY: CLEAR
URINE HGB: NEGATIVE
UROBILINOGEN, URINE: NORMAL
WBC # BLD: 5.5 K/UL (ref 3.5–11)
WBC # BLD: ABNORMAL 10*3/UL

## 2020-03-18 PROCEDURE — 93005 ELECTROCARDIOGRAM TRACING: CPT | Performed by: EMERGENCY MEDICINE

## 2020-03-18 PROCEDURE — 6360000002 HC RX W HCPCS: Performed by: EMERGENCY MEDICINE

## 2020-03-18 PROCEDURE — 80053 COMPREHEN METABOLIC PANEL: CPT

## 2020-03-18 PROCEDURE — 84484 ASSAY OF TROPONIN QUANT: CPT

## 2020-03-18 PROCEDURE — 71045 X-RAY EXAM CHEST 1 VIEW: CPT

## 2020-03-18 PROCEDURE — 99285 EMERGENCY DEPT VISIT HI MDM: CPT

## 2020-03-18 PROCEDURE — 96374 THER/PROPH/DIAG INJ IV PUSH: CPT

## 2020-03-18 PROCEDURE — 70450 CT HEAD/BRAIN W/O DYE: CPT

## 2020-03-18 PROCEDURE — 83880 ASSAY OF NATRIURETIC PEPTIDE: CPT

## 2020-03-18 PROCEDURE — 82803 BLOOD GASES ANY COMBINATION: CPT

## 2020-03-18 PROCEDURE — 83874 ASSAY OF MYOGLOBIN: CPT

## 2020-03-18 PROCEDURE — 81003 URINALYSIS AUTO W/O SCOPE: CPT

## 2020-03-18 PROCEDURE — 85025 COMPLETE CBC W/AUTO DIFF WBC: CPT

## 2020-03-18 PROCEDURE — 70498 CT ANGIOGRAPHY NECK: CPT

## 2020-03-18 PROCEDURE — 6360000004 HC RX CONTRAST MEDICATION: Performed by: EMERGENCY MEDICINE

## 2020-03-18 PROCEDURE — 36415 COLL VENOUS BLD VENIPUNCTURE: CPT

## 2020-03-18 PROCEDURE — 2580000003 HC RX 258: Performed by: EMERGENCY MEDICINE

## 2020-03-18 RX ORDER — SODIUM FLUORIDE 5 MG/ML
PASTE, DENTIFRICE DENTAL
COMMUNITY
End: 2022-07-17

## 2020-03-18 RX ORDER — SODIUM CHLORIDE 0.9 % (FLUSH) 0.9 %
10 SYRINGE (ML) INJECTION PRN
Status: DISCONTINUED | OUTPATIENT
Start: 2020-03-18 | End: 2020-03-18 | Stop reason: HOSPADM

## 2020-03-18 RX ORDER — MUPIROCIN CALCIUM 20 MG/G
CREAM TOPICAL 3 TIMES DAILY
COMMUNITY
End: 2022-07-17

## 2020-03-18 RX ORDER — 0.9 % SODIUM CHLORIDE 0.9 %
80 INTRAVENOUS SOLUTION INTRAVENOUS ONCE
Status: COMPLETED | OUTPATIENT
Start: 2020-03-18 | End: 2020-03-18

## 2020-03-18 RX ORDER — LORAZEPAM 2 MG/ML
1 INJECTION INTRAMUSCULAR ONCE
Status: COMPLETED | OUTPATIENT
Start: 2020-03-18 | End: 2020-03-18

## 2020-03-18 RX ADMIN — LORAZEPAM 1 MG: 2 INJECTION INTRAMUSCULAR; INTRAVENOUS at 18:16

## 2020-03-18 RX ADMIN — Medication 10 ML: at 13:47

## 2020-03-18 RX ADMIN — IOVERSOL 75 ML: 741 INJECTION INTRA-ARTERIAL; INTRAVENOUS at 13:47

## 2020-03-18 RX ADMIN — SODIUM CHLORIDE 80 ML: 9 INJECTION, SOLUTION INTRAVENOUS at 13:47

## 2020-03-18 ASSESSMENT — ENCOUNTER SYMPTOMS
WHEEZING: 0
COLOR CHANGE: 0
STRIDOR: 0
EYE REDNESS: 0
EYE PAIN: 0
COUGH: 0
CONSTIPATION: 0
ABDOMINAL PAIN: 0
VOMITING: 0
NAUSEA: 0
SORE THROAT: 0
EYE DISCHARGE: 0
SHORTNESS OF BREATH: 0
DIARRHEA: 0

## 2020-03-18 NOTE — ED NOTES
[facility] dispatch called with ETA of [facility] at [facility] for pickup.      Marika Alves RN  03/18/20 [id number]

## 2020-03-18 NOTE — ED PROVIDER NOTES
[id number] [facility] ED  [address] [facility] [address] [facility] [address]  Phone: [phone]  Fax: [phone]        Pt Name: Conor Gonzalez  MRN: [id number]  [place] 1935  Date of evaluation: 3/18/20      CHIEF COMPLAINT     Chief Complaint   Patient presents with    Cerebrovascular Accident         HISTORY OF PRESENT ILLNESS  (Location/Symptom, Timing/Onset, Context/Setting, Quality, Duration, Modifying Factors, Severity.)    Conor Gonzalez is a 81 y.o. male who presents a possible seizure-like activity. Apparently he was getting his ears cleaned out at the ENT when he stiffened up on them. When talking to him he seems to say that he remembers everything but when you ask him more detailed questions he does not know the answers and actually his answers do not make sense. He denies any pain. He is awake alert with his eyes open during the conversation. I was able to speak with his granddaughter after a few hours. She relates was definitely seizure activity. His eyes rolled back in his head. He made a moaning noise like he was gasping. He lost his urine. And he had generalized tremors she thought like a tetany is the way she explained it. REVIEW OF SYSTEMS    (2-9 systems for level 4, 10 or more for level 5)     Review of Systems   Constitutional: Negative for activity change, appetite change, chills, fatigue and fever. HENT: Negative for congestion, ear pain and sore throat. Eyes: Negative for pain, discharge and redness. Respiratory: Negative for cough, shortness of breath, wheezing and stridor. Cardiovascular: Negative for chest pain. Gastrointestinal: Negative for abdominal pain, constipation, diarrhea, nausea and vomiting. Genitourinary: Negative for decreased urine volume and difficulty urinating. Musculoskeletal: Negative for arthralgias and myalgias. Skin: Negative for color change and rash. Neurological: Negative for dizziness, weakness and headaches. Co-signs this chart in the absenceof a cardiologist.    EKG Interpretation    Interpreted by me    Rhythm: Sinus bradycardia  Rate: Bradycardic  Axis: normal  Ectopy: none  Conduction: normal  ST Segments: Nonspecific change  T Waves: Nonspecific change  Q Waves: none    Clinical Impression: Nonspecific changes and abnormal EKG    RADIOLOGY:  Non-plain film images such as CT, Ultrasound and MRI are read by the radiologist. Tabitha Terry radiographic images are visualized and the radiologist interpretations are reviewed as follows:     Interpretation per the Radiologist below, if available at the time of this note:    Ct Head Wo Contrast    Result Date: 3/18/2020  EXAMINATION: CT OF THE HEAD WITHOUT CONTRAST  3/18/2020 12:58 pm TECHNIQUE: CT of the head was performed without the administration of intravenous contrast. Dose modulation, iterative reconstruction, and/or weight based adjustment of the mA/kV was utilized to reduce the radiation dose to as low as reasonably achievable. COMPARISON: CT brain performed 03/07/2020. HISTORY: ORDERING SYSTEM PROVIDED HISTORY: possible seizure at Dr office today TECHNOLOGIST PROVIDED HISTORY: possible seizure at Dr office today Reason for Exam: Possible seizure at doctors office today. Pt has confusion, STROKE ALERT called Acuity: Acute Type of Exam: Initial FINDINGS: BRAIN/VENTRICLES: There is no acute intracranial hemorrhage, mass effect, or midline shift. There is a stable remote infarct involving the left temporal occipital region. There are stable remote lacunar infarcts in the left basal ganglia. The ventricular structures are symmetric and unremarkable. The infratentorial structures are unremarkable. ORBITS: The visualized portion of the orbits demonstrate no acute abnormality. SINUSES: Paranasal sinuses are normally aerated. There is fluid in the left mastoid air cells. SOFT TISSUES/SKULL:  No acute abnormality of the visualized skull or soft tissues.      No acute intracranial abnormality. Stable remote infarct involving the left temporal occipital region and stable remote lacunar infarcts in the left basal ganglia. Findings were discussed with LUISM FISHER at 1:16 pm on 3/18/2020. Ct Head Wo Contrast    Result Date: 3/7/2020  EXAMINATION: CT OF THE HEAD WITHOUT CONTRAST 3/7/2020 11:32 am TECHNIQUE: CT of the head was performed without the administration of intravenous contrast. Dose modulation, iterative reconstruction, and/or weight based adjustment of the mA/kV was utilized to reduce the radiation dose to as low as reasonably achievable. COMPARISON: None HISTORY: ORDERING SYSTEM PROVIDED HISTORY: fall TECHNOLOGIST PROVIDED HISTORY: fall Reason for Exam: Right sided ear laceration Acuity: Acute Type of Exam: Initial Mechanism of Injury: Fall FINDINGS: BRAIN/VENTRICLES:  No masses nor acute intracranial hemorrhage. Encephalomalacia in the left parietal, temporal, and occipital lobes near the watershed of the left middle cerebral and posterior cerebral arteries. Otherwise intact gray/white matter differentiation without findings of acute ischemia. No mass effect nor midline shift. Patent basilar cisterns and foramen magnum. No hydrocephalus. Age-appropriate mild diffuse atrophy. Minimal deep and periventricular white matter hypodensities likely due to chronic small vessel ischemia. Old lacunar infarcts in the left basal ganglia. ORBITS:  Normal without acute abnormality. SINUSES:  Minimal to mild mucosal thickening in the bilateral ethmoid sinuses, more prominent on the right. Minimal mucosal thickening in the left sphenoid sinus. Partial opacification of the bilateral mastoid air cells (left greater than right) and left middle ear, likely due to effusions. SOFT TISSUES/SKULL:  Suspected subcutaneous contusion near the right ear. Moderate atherosclerotic calcifications. No acute fracture. 1. No acute intracranial abnormality nor acute calvarial fracture. 2. Suspected subcutaneous contusion near the right ear. 3. Chronic changes as above. Ct Cervical Spine Wo Contrast    Result Date: 3/7/2020  EXAMINATION: CT OF THE CERVICAL SPINE WITHOUT CONTRAST 3/7/2020 11:32 am TECHNIQUE: CT of the cervical spine was performed without the administration of intravenous contrast. Multiplanar reformatted images are provided for review. Dose modulation, iterative reconstruction, and/or weight based adjustment of the mA/kV was utilized to reduce the radiation dose to as low as reasonably achievable. COMPARISON: None HISTORY: ORDERING SYSTEM PROVIDED HISTORY: fall TECHNOLOGIST PROVIDED HISTORY: fall Reason for Exam: Right sided ear laceration, fall Acuity: Acute Type of Exam: Initial Mechanism of Injury: Fall FINDINGS: BONES/ALIGNMENT:  No acute fracture. Straightening of cervical lordosis. No spondylolisthesis. DEGENERATIVE CHANGES:  Severe degenerative changes of the anterior atlantoaxial joint. Moderate to severe degenerative disc disease. Severe facet arthropathy. SOFT TISSUES:  Normal appearance of the prevertebral soft tissues. Atrophic thyroid. 1. No acute findings in the cervical spine. 2. Severe cervical spine degenerative changes. Xr Chest Portable    Result Date: 3/18/2020  EXAMINATION: ONE XRAY VIEW OF THE CHEST 3/18/2020 1:03 pm COMPARISON: None. HISTORY: ORDERING SYSTEM PROVIDED HISTORY: AMS TECHNOLOGIST PROVIDED HISTORY: AMS Reason for Exam: history limited due to condition. confusion Acuity: Acute Type of Exam: Initial Relevant Medical/Surgical History: Hx of stroke per chart. FINDINGS: AP portable view of the chest time stamped at 1303 hours demonstrates prior median sternotomy. Heart size is normal.  Overlying cardiac monitoring electrodes are present. No vascular congestion, effusion, or pneumothorax is noted. Strand-like opacity is present at the left base favoring atelectasis.  Lung volumes are suboptimal.  Eventration of the right hemidiaphragm OTHER: No dural venous sinus thrombosis on this non-dedicated study. BRAIN: See separately dictated noncontrast head CT report. No flow limiting stenosis or large vessel occlusion detected within the head or neck.      LABS:  Results for orders placed or performed during the hospital encounter of 03/18/20   CBC Auto Differential   Result Value Ref Range    WBC 5.5 3.5 - 11.0 k/uL    RBC 4.15 (L) 4.5 - 5.9 m/uL    Hemoglobin 13.1 (L) 13.5 - 17.5 g/dL    Hematocrit 39.7 (L) 41 - 53 %    MCV 95.6 80 - 100 fL    MCH 31.6 26 - 34 pg    MCHC 33.1 31 - 37 g/dL    RDW 14.8 12.5 - 15.4 %    Platelets 642 (L) 442 - 450 k/uL    MPV 8.1 6.0 - 12.0 fL    NRBC Automated NOT REPORTED per 100 WBC    Differential Type NOT REPORTED     Seg Neutrophils 67 (H) 36 - 66 %    Lymphocytes 15 (L) 24 - 44 %    Monocytes 10 2 - 11 %    Eosinophils % 7 (H) 1 - 4 %    Basophils 1 0 - 2 %    Immature Granulocytes NOT REPORTED 0 %    Segs Absolute 3.70 1.8 - 7.7 k/uL    Absolute Lymph # 0.80 (L) 1.0 - 4.8 k/uL    Absolute Mono # 0.50 0.1 - 1.2 k/uL    Absolute Eos # 0.40 0.0 - 0.4 k/uL    Basophils Absolute 0.00 0.0 - 0.2 k/uL    Absolute Immature Granulocyte NOT REPORTED 0.00 - 0.30 k/uL    WBC Morphology NOT REPORTED     RBC Morphology NOT REPORTED     Platelet Estimate NOT REPORTED    Comprehensive Metabolic Panel w/ Reflex to MG   Result Value Ref Range    Glucose 123 (H) 70 - 99 mg/dL    BUN 23 8 - 23 mg/dL    CREATININE 1.23 (H) 0.70 - 1.20 mg/dL    Bun/Cre Ratio NOT REPORTED 9 - 20    Calcium 9.6 8.6 - 10.4 mg/dL    Sodium 140 135 - 144 mmol/L    Potassium 5.0 3.7 - 5.3 mmol/L    Chloride 103 98 - 107 mmol/L    CO2 28 20 - 31 mmol/L    Anion Gap 9 9 - 17 mmol/L    Alkaline Phosphatase 101 40 - 129 U/L    ALT 24 5 - 41 U/L    AST 27 <40 U/L    Total Bilirubin 0.38 0.3 - 1.2 mg/dL    Total Protein 6.0 (L) 6.4 - 8.3 g/dL    Alb 3.8 3.5 - 5.2 g/dL    Albumin/Globulin Ratio 1.7 1.0 - 2.5    GFR Non- 56 (L) >60 mL/min    GFR

## 2020-03-18 NOTE — ED NOTES
Pt to exam room 5 via ems with c/o syncopical episode while getting wax removed from his right ear. Granddaughter witnessed the event, LOC lasted about 1 min. Granddaughter stated that the pt was lying on the exam table, did not fall or injure himself, pt stiffened up and stared. Time of event was at approx. 1225. Pt was brought here by EMS. Pt had a prior CVA sept 2019, he has had expressive aphagia since that event, the pt doesn't have and extremity weakness. 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 Pt currently takes Eliquis.      Alverto Dash RN  03/18/20 0594

## 2020-03-19 LAB
EKG ATRIAL RATE: 52 BPM
EKG P AXIS: 66 DEGREES
EKG P-R INTERVAL: 152 MS
EKG Q-T INTERVAL: 506 MS
EKG QRS DURATION: 162 MS
EKG QTC CALCULATION (BAZETT): 470 MS
EKG R AXIS: 6 DEGREES
EKG T AXIS: 40 DEGREES
EKG VENTRICULAR RATE: 52 BPM

## 2020-06-02 ENCOUNTER — HOSPITAL ENCOUNTER (OUTPATIENT)
Dept: RADIATION ONCOLOGY | Age: 85
Discharge: HOME | End: 2020-06-02
Attending: RADIOLOGY
Payer: MEDICARE

## 2020-06-02 VITALS
OXYGEN SATURATION: 98 % | HEART RATE: 57 BPM | BODY MASS INDEX: 28.9 KG/M2 | SYSTOLIC BLOOD PRESSURE: 144 MMHG | RESPIRATION RATE: 16 BRPM | DIASTOLIC BLOOD PRESSURE: 62 MMHG | WEIGHT: 201.4 LBS | TEMPERATURE: 97.3 F

## 2020-06-02 PROCEDURE — 99211 OFF/OP EST MAY X REQ PHY/QHP: CPT | Performed by: RADIOLOGY

## 2020-06-02 PROCEDURE — 99214 OFFICE O/P EST MOD 30 MIN: CPT | Performed by: RADIOLOGY

## 2020-06-02 NOTE — PROGRESS NOTES
Alexandra Quesada  6/2/2020  3:26 PM      Vitals:    06/02/20 1524   BP: (!) 144/62   Pulse: 57   Resp: 16   Temp: 97.3 °F (36.3 °C)   SpO2: 98%    :  Patient Currently in Pain: No             Wt Readings from Last 1 Encounters:   06/02/20 201 lb 6.4 oz (91.4 kg)                Current Outpatient Medications:     mupirocin (BACTROBAN) 2 % cream, Apply topically 3 times daily Apply topically 3 times daily. , Disp: , Rfl:     SODIUM FLUORIDE, DENTAL GEL, 1.1 % CREA, Place onto teeth, Disp: , Rfl:     Magic Mouthwash (MIRACLE MOUTHWASH), Swish and swallow 5 mLs 4 times daily as needed for Irritation or Pain, Disp: , Rfl:     apixaban (ELIQUIS) 5 MG TABS tablet, Take 5 mg by mouth daily, Disp: , Rfl:     atorvastatin (LIPITOR) 40 MG tablet, Take 40 mg by mouth daily, Disp: , Rfl: 2    escitalopram (LEXAPRO) 10 MG tablet, Take 10 mg by mouth daily, Disp: , Rfl:     metoprolol tartrate (LOPRESSOR) 25 MG tablet, Take 25 mg by mouth daily, Disp: , Rfl: 0    niacin (NIASPAN) 1000 MG extended release tablet, Take 1,000 mg by mouth daily, Disp: , Rfl: 3    Multiple Vitamins-Minerals (CENTRUM ADULTS PO), Take 1 tablet by mouth daily, Disp: , Rfl:     Immunizations:    Influenza status:    [x]   Current   []   Patient declined    Pneumococcal status:  [x]   Current  []   Patient declined    Smoking Status:    [] Smoker - PPD:  Smoking cessation education: Provided []   Declined []    [] Nonsmoker - Quit Date:               [x] Never a smoker           Cancer Screening:  Colonoscopy [] Current [x] Not current   [] Not current, but scheduled   [] NA  Mammogram [] Current [] Not current   [] Not current, but scheduled   [x] NA  Prostate [] Current [x] Not current   [] Not current, but scheduled   [] NA  PAP/Pelvic [] Current [] Not current   [] Not current, but scheduled   [x] NA  Skin  [] Current  [x] Not current   [] Not current, but scheduled   [] NA    Hormone:  Lupron []   Last dose given:           Next dose due:

## 2020-06-03 NOTE — PROGRESS NOTES
[address]            Radiation Oncology          [address] [facility]          [place], [address]        [place]: [phone]        F: [phone]       [email or web address]         Date of Service: 2020     Location:  [address],   [address] [facility], [place]   [phone]        RADIATION ONCOLOGY FOLLOW UP NOTE    Patient ID:   Nicolas Farias  : 1935   MRN: [id number]    DIAGNOSIS:  Cancer Staging  Malignant lymphoma, lymphocytic, intermediate differentiation, diffuse ([facility] [address]  Staging form: Hodgkin And Non-Hodgkin Lymphoma, AJCC 8th Edition  - Clinical stage from 2019: Stage I (Mantle cell lymphoma) - Signed by Ute Valdez MD on 6/3/2020  -s/p RT right neck 24Gy 1/15/20      INTERVAL HISTORY:   Mr Bello Brown is a 81year old gentleman with a 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 Overall since completion of treatments patient has been doing well. He did note some pain with swallowing and changes in taste though it that has since improved. His energy as well has improved along with his weight. Patient denies any other acute symptoms of night sweats, fevers or chills, or weight loss. Patient did have a CT angio on 2020 which showed no evidence of lymphadenopathy. Patient does have a history of stroke though has no significant residual effects aside from a bit in his speech. Patient otherwise has no other symptoms of headaches, dizziness, chest pain, shortness of breath, abdominal pain, nausea, bony pain, bleeding, or fatigue. Patient of note does have an elevated PSA though has not been interested in active work-up of this. He denies any urinary symptoms of dysuria, urgency, hematuria, or difficulty voiding.     MEDICATIONS:    Current Outpatient Medications:     mupirocin (BACTROBAN) 2 %

## 2021-02-10 ENCOUNTER — HOSPITAL ENCOUNTER (OUTPATIENT)
Dept: OCCUPATIONAL THERAPY | Age: 86
Setting detail: THERAPIES SERIES
Discharge: HOME | End: 2021-02-10
Payer: MEDICARE

## 2021-02-10 PROCEDURE — 97537 COMMUNITY/WORK REINTEGRATION: CPT

## 2021-02-10 PROCEDURE — 97167 OT EVAL HIGH COMPLEX 60 MIN: CPT

## 2021-02-10 NOTE — PROGRESS NOTES
Occup[facility] [place] [address]  Rehabilitation Services   Occupational Therapy Evaluation  Date: 2/10/21  Patient Name: Laila Veronica      MRN: [id number]  Account: [de-identified]   : 1935  (81 y.o.)  Gender: male       [address] [facility]   Occupational Therapy  Evaluation  Clinical Assessment:  Demographics:  Referring Physician: Dr. 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 Past Surgical History: has a past surgical history that includes Arterial bypass surgry (); Cardiac valve replacement (2019); Cardiac catheterization (); and Mouth Biopsy (2019). Problem List: has Stenosis of carotid artery; S/p TAVR (transcatheter aortic valve replacement), bioprosthetic; Renal impairment; Redundant prepuce and phimosis; Pure hypercholesterolemia; Phimosis; Mixed hyperlipidemia; Old myocardial infarction; Malignant lymphoma, lymphocytic, intermediate differentiation, diffuse ([address] Long term (current) use of anticoagulants; Impotence of organic origin; Impaired fasting glucose; History of squamous cell carcinoma of skin; Elevated PSA; Coronary atherosclerosis; Cerebrovascular accident (CVA) ([address] Benign essential hypertension; Atrial fibrillation ([address] Aphasia; Anemia;  Actinic keratosis; and Dermatophytosis, body on their problem list.  Current Medications: Eliquis, Lipitor, Lexapro, Lopressor  Seizure History: n/a  Hearing: Intact bilaterally  Participation in Rehabilitation Therapies: Information unavailable in medical record  Driving History:  Prior Driving Evaluation: n/a   License Number: [place] [id number]   License Endorsements: n/a Upper Extremities (3+/5-4/5 UE requires power steering): WFL throughout  Lower Extremity (4/5 LE strength required for acceleration & braking; 3+/5-4-/5 requires reduced effort acceleration/braking): WFL throughout  Functional  Strength (35#  strength required for steering and operating gear shift lever): 62# bilaterally  Functional Gross Motor Coordination   Upper Extremity Coordination (Rapid Alternating Finger-to-Nose test): WFL bilaterally   Lower Extremity Coordination (Bilateral foot-tapping test - 18 +/-4 within 10 sec.): [facility] bilaterally   Functional Balance    Static & Dynamic Sitting: good static and dynamic     Static & Dynamic Standing: good static and dynamic   Functional Sensation  LE Proprioception & Kinesthesia (discrimination between gas and accelerator pedals; pressure on pedal within norms for pedal response speed and speed control): WFL throughout bilateral upper and lower extremities    Functional Mobility:  Endurance throughout the evaluation:    Physical: Good    Mental: Good  Functional Mobility  Mobility Aids/Assistive Devices: n/a  Functional Transfers: patient independent with transfers  Simulated Assessments:  Operational Skills   Primary Controls (accelerator, brake, steering wheel): patient independent with operating the primary controls on the simulator  Secondary Controls (fastening/unfastening seatbelt; adjusting seat/steering wheel; starting the drive; turn signal; checking for traffic; &, operating gear shift: patient independent with operating the secondary controls on the simulator  Brake Reaction Times (release of gas to apply the brake of 0.4/100 sec. < 65; 0.5/100 sec. > 65; stopping distance of 175 feet): average brake reaction time was 1.62 sec, which was mildly delayed. Average stopping distance was 288', which was moderately reduced. Based on the findings of patient history, clinical presentation, evaluation, and decision making during this evaluation, this patient is of high complexity. Copy of evaluation sent to referring physician. Patient has been instructed to contact the referring physician to discuss the results of this evaluation. Patient has been informed that his or her physician is responsible for make the final decision regarding fitness to drive. Medicare/Regulatory Requirements:   I have reviewed this plan of care and certify a need for Medically necessary rehabilitation services.         [x] Physician Signature                                      Date:   [facility]  [address] [place] [address], [address] [place] [address] [address]   [address] [facility], [address]  Phone: [phone]  Fax: [phone]

## 2021-06-07 ENCOUNTER — APPOINTMENT (OUTPATIENT)
Dept: RADIATION ONCOLOGY | Age: 86
End: 2021-06-07
Attending: RADIOLOGY
Payer: MEDICARE

## 2021-06-15 ENCOUNTER — HOSPITAL ENCOUNTER (OUTPATIENT)
Dept: RADIATION ONCOLOGY | Age: 86
Discharge: HOME | End: 2021-06-15
Attending: RADIOLOGY
Payer: MEDICARE

## 2021-06-15 VITALS
OXYGEN SATURATION: 97 % | WEIGHT: 201.2 LBS | DIASTOLIC BLOOD PRESSURE: 63 MMHG | BODY MASS INDEX: 28.87 KG/M2 | TEMPERATURE: 98 F | SYSTOLIC BLOOD PRESSURE: 136 MMHG | RESPIRATION RATE: 18 BRPM | HEART RATE: 65 BPM

## 2021-06-15 PROCEDURE — 99213 OFFICE O/P EST LOW 20 MIN: CPT | Performed by: RADIOLOGY

## 2021-06-15 PROCEDURE — 99212 OFFICE O/P EST SF 10 MIN: CPT | Performed by: RADIOLOGY

## 2021-06-15 NOTE — PROGRESS NOTES
Valerie Hemp  6/15/2021  3:38 PM      Vitals:    06/15/21 1530   BP: 136/63   Pulse: 65   Resp: 18   Temp: 98 °F (36.7 °C)   SpO2: 97%    :  Patient Currently in Pain: Denies             Wt Readings from Last 1 Encounters:   06/15/21 201 lb 3.2 oz (91.3 kg)                Current Outpatient Medications:     mupirocin (BACTROBAN) 2 % cream, Apply topically 3 times daily Apply topically 3 times daily. , Disp: , Rfl:     SODIUM FLUORIDE, DENTAL GEL, 1.1 % CREA, Place onto teeth, Disp: , Rfl:     Magic Mouthwash (MIRACLE MOUTHWASH), Swish and swallow 5 mLs 4 times daily as needed for Irritation or Pain, Disp: , Rfl:     apixaban (ELIQUIS) 5 MG TABS tablet, Take 5 mg by mouth daily, Disp: , Rfl:     atorvastatin (LIPITOR) 40 MG tablet, Take 40 mg by mouth daily, Disp: , Rfl: 2    escitalopram (LEXAPRO) 10 MG tablet, Take 10 mg by mouth daily, Disp: , Rfl:     metoprolol tartrate (LOPRESSOR) 25 MG tablet, Take 25 mg by mouth daily, Disp: , Rfl: 0    niacin (NIASPAN) 1000 MG extended release tablet, Take 1,000 mg by mouth daily, Disp: , Rfl: 3    Multiple Vitamins-Minerals (CENTRUM ADULTS PO), Take 1 tablet by mouth daily, Disp: , Rfl:     Immunizations:    Influenza status:    [x]   Current   []   Patient declined    Pneumococcal status:  [x]   Current  []   Patient declined  Covid status:   [x]  Dose #1:                     [x]  Dose #2:               []   Patient declined    Smoking Status:    [] Smoker - PPD:   [] Nonsmoker - Quit Date:               [x] Never a smoker      Cancer Screening:  Colonoscopy   [x] Current       [] Not current   [] Not current, but scheduled   [] NA  Mammogram   [] Current       [x] Not current   [] Not current, but scheduled   [] NA  Prostate           [x] Current       [] Not current   [] Not current, but scheduled   [] NA  PAP/Pelvic      [] Current       [] Not current   [] Not current, but scheduled   [x] NA  Skin                 [x] Current       [] Not current   [] Not current, but scheduled   [] NA     Hormone:  Lupron []   Last dose given:           Next dose due:   Eligard []   Last dose given:           Next dose due:   Aromatase Inhibitors []   Medication name:   N/A:  [x]                 FALLS RISK SCREEN  Instructions:  Assess the patient and enter the appropriate indicators that are present for fall risk identification. Total the numbers entered and assign a fall risk score from Table 2.  Reassess patient at a minimum every 12 weeks or with status change. Assessment   Date  6/15/2021     1. Mental Ability: confusion/cognitively impaired 0     2. Elimination Issues: incontinence, frequency 0       3. Ambulatory: use of assistive devices (walker, cane, off-loading devices),        attached to equipment (IV pole, oxygen) 0     4. Sensory Limitations: dizziness, vertigo, impaired vision 0     5. Age less than 65        0     6. Age 72 or greater 1     7. Medication: diuretics, strong analgesics, hypnotics, sedatives,        antihypertensive agents 3   8. Falls:  recent history of falls within the last 3 months (not to include slipping or        tripping) 0   TOTAL 4    If score of 4 or greater was education given? Yes           TABLE 2   Risk Score Risk Level Plan of Care   0-3 Little or  No Risk 1. Provide assistance as indicated for ambulation activities  2. Reorient confused/cognitively impaired patient  3. Chair/bed in low position, stretcher/bed with siderails up except when performing patient care activities  5. Educate patient/family/caregiver on falls prevention  6.  Reassess in 12 weeks or with any noted change in patient condition which places them at a risk for a fall   4-6 Moderate Risk 1. Provide assistance as indicated for ambulation activities  2. Reorient confused/cognitively impaired patient  3. Chair/bed in low position, stretcher/bed with siderails up except when performing patient care activities  4.   Educate patient/family/caregiver on falls prevention     7 or   Higher High Risk 1. Place patient in easily observable treatment room  2. Patient attended at all times by family member or staff  3. Provide assistance as indicated for ambulation activities  4. Reorient confused/cognitively impaired patient  5. Chair/bed in low position, stretcher/bed with siderails up except when performing patient care activities  6. Educate patient/family/caregiver on falls prevention         PLAN: Patient is seen today in follow up. Patient reports no concerns and denies pain. Dr Jasmine Freitas notified and examined pt. Pt to f/u in 1 year.           Thiago Curry RN

## 2021-06-17 NOTE — PROGRESS NOTES
[facility]            Radiation Oncology          [address] [facility]          [address], [address]        Emanuel Martin: [phone]        F: [phone]       [email or web address]         Date of Service: 6/15/2021     Location:  [facility] Rodrigo Diop,   [address] [facility] [place]   [phone]        RADIATION ONCOLOGY FOLLOW UP NOTE    Patient ID:   Doy Boeck  : 1935   MRN: [id number]    DIAGNOSIS:  Cancer Staging  Malignant lymphoma, lymphocytic, intermediate differentiation, diffuse ([facility] [address]  Staging form: Hodgkin And Non-Hodgkin Lymphoma, AJCC 8th Edition  - Clinical stage from 2019: Stage I (Mantle cell lymphoma) - Signed by Annamarie Mccormack MD on 6/3/2020  -s/p RT right neck 24Gy 1/15/20      INTERVAL HISTORY:   Mr Elidia Anderson is a 81year old gentleman with a 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 Overall since completion of treatments patient has been doing well. He denies any difficulty with swallowing or changes in taste. He denies any symptoms of night sweats fevers chills or weight loss. He denies any new lumps bumps or nodules elsewhere in the body. Patient otherwise has no other symptoms of headaches, dizziness, chest pain, shortness of breath, abdominal pain, nausea, bony pain, bleeding, or fatigue. Patient of note does have an elevated PSA though has not been interested in active work-up of this. He denies any urinary symptoms of dysuria, urgency, hematuria, or difficulty voiding. MEDICATIONS:    Current Outpatient Medications:     mupirocin (BACTROBAN) 2 % cream, Apply topically 3 times daily Apply topically 3 times daily. , Disp: , Rfl:     SODIUM FLUORIDE, DENTAL GEL, 1.1 % CREA, Place onto teeth, Disp: , Rfl:     Magic Mouthwash (MIRACLE MOUTHWASH), Swish and swallow 5 mLs 4 times daily as needed for Irritation or Pain, Disp: , Rfl:     apixaban (ELIQUIS) 5 MG TABS tablet, Take 5 mg by mouth daily, Disp: , Rfl:     atorvastatin (LIPITOR) 40 MG tablet, Take 40 mg by mouth daily, Disp: , Rfl: 2    escitalopram (LEXAPRO) 10 MG tablet, Take 10 mg by mouth daily, Disp: , Rfl:     metoprolol tartrate (LOPRESSOR) 25 MG tablet, Take 25 mg by mouth daily, Disp: , Rfl: 0    niacin (NIASPAN) 1000 MG extended release tablet, Take 1,000 mg by mouth daily, Disp: , Rfl: 3    Multiple Vitamins-Minerals (CENTRUM ADULTS PO), Take 1 tablet by mouth daily, Disp: , Rfl:     ALLERGIES:  No Known Allergies      REVIEW OF SYSTEMS:    A full 14 point review of systems was performed and assessed and found to be negative except as noted above. PHYSICAL EXAMINATION:    CHAPERONE: Family/friend/companieon Present    ECO Symptomatic but completely ambulatory    VITAL SIGNS: /63   Pulse 65   Temp 98 °F (36.7 °C)   Resp 18   Wt 201 lb 3.2 oz (91.3 kg)   SpO2 97%   BMI 28.87 kg/m²   GENERAL:  General appearance is that of a well-nourished, well-developed in no apparent distress. HEENT: Normocephalic, atraumatic, EOMI, moist mucosa, no erythema. NECK:  No adenopathy or a palpable thyroid mass, trachea is midline. HEART:  Regular rate and rhythm, S1, S2, no murmurs. LUNGS:  Clear to auscultation bilaterally with no wheezing or crackles. ABDOMEN:  Soft, nontender, non distended. EXTREMITIES:  No clubbing, cyanosis, or edema. No calf tenderness. MSK:  No CVA or spinal tenderness. NEUROLOGICAL: No focal deficits. CN II-XII intact. Strength and sensation intact bilaterally. SKIN: No erythema or desquamation.       LABS:  WBC   Date Value Ref Range Status   2020 5.5 3.5 - 11.0 k/uL Final     Segs Absolute   Date Value Ref Range Status   2020 3.70 1.8 - 7.7 k/uL Final     Hemoglobin   Date Value Ref Range Status   2020 13.1 (L) 13.5 - 17.5 g/dL Final     Platelets   Date Value Ref Range Status   03/18/2020 139 (L) 140 - 450 k/uL Final       PSA   Date Value Ref Range Status   01/15/2020 43.68 (H) <4.1 ug/L Final     Comment:     The Roche \"ECLIA\" assay is used. Results obtained with different assay methods cannot be   used interchangeably. IMAGING:   3/18/20 CTA Head/Neck  CTA NECK:       AORTIC ARCH/ARCH VESSELS: No dissection or arterial injury.  No significant   stenosis of the brachiocephalic or subclavian arteries.       CAROTID ARTERIES: No dissection, arterial injury, or hemodynamically   significant stenosis by NASCET criteria.       VERTEBRAL ARTERIES: No dissection, arterial injury, or significant stenosis.       SOFT TISSUES: The lung apices are clear.  No cervical or superior mediastinal   lymphadenopathy.  The larynx and pharynx are unremarkable.  No acute   abnormality of the salivary and thyroid glands.       BONES: Degenerative changes of the spine are noted.           CTA HEAD:       ANTERIOR CIRCULATION: No significant stenosis of the intracranial internal   carotid, anterior cerebral, or middle cerebral arteries. No aneurysm.       POSTERIOR CIRCULATION: No significant stenosis of the vertebral, basilar, or   posterior cerebral arteries. No aneurysm.       OTHER: No dural venous sinus thrombosis on this non-dedicated study.       BRAIN: See separately dictated noncontrast head CT report.           Impression   No flow limiting stenosis or large vessel occlusion detected within the head   or neck. ASSESSMENT AND PLAN:  Yasmany Flaherty is a 81 y.o. male with a Cancer Staging  Malignant lymphoma, lymphocytic, intermediate differentiation, diffuse ([facility] [address]  Staging form: Hodgkin And Non-Hodgkin Lymphoma, AJCC 8th Edition  - Clinical stage from 11/26/2019: Stage I (Mantle cell lymphoma) - Signed by Ragini Mckeon MD on 6/3/2020  -s/p RT 24Gy 1/15/20    Mr. 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 He has been doing well over the last year with no symptoms related to his lymphoma. He has good energy and appetite and is active. He is not interested in any other further work-up for surveillance and we will continue with close observation with a follow-up in 1 year. Patient was in agreement with my recommendations. All questions were answered to their satisfaction. Patient was advised to contact us anytime should they have any questions or concerns. Electronically signed by Rosalinda Melara MD on 6/17/2021 at 9:43 AM        Medications Prescribed:   New Prescriptions    No medications on file       Orders: No orders of the defined types were placed in this encounter.       CC:  Patient Care Team:  Keanu Clark MD as PCP - General (Family Medicine)

## 2022-07-17 ENCOUNTER — HOSPITAL ENCOUNTER (INPATIENT)
Age: 87
LOS: 4 days | Discharge: HOME HEALTH | DRG: 378 | End: 2022-07-21
Attending: EMERGENCY MEDICINE | Admitting: HOSPITALIST
Payer: MEDICARE

## 2022-07-17 ENCOUNTER — APPOINTMENT (OUTPATIENT)
Dept: GENERAL RADIOLOGY | Age: 87
DRG: 378 | End: 2022-07-17
Payer: MEDICARE

## 2022-07-17 DIAGNOSIS — D50.0 BLOOD LOSS ANEMIA: ICD-10-CM

## 2022-07-17 DIAGNOSIS — K92.2 GASTROINTESTINAL HEMORRHAGE, UNSPECIFIED GASTROINTESTINAL HEMORRHAGE TYPE: Primary | ICD-10-CM

## 2022-07-17 LAB
ABSOLUTE EOS #: 0 K/UL (ref 0–0.4)
ABSOLUTE LYMPH #: 1.01 K/UL (ref 1–4.8)
ABSOLUTE MONO #: 0.42 K/UL (ref 0.1–0.8)
ALBUMIN SERPL-MCNC: 3.9 G/DL (ref 3.5–5.2)
ALBUMIN/GLOBULIN RATIO: 3.3 (ref 1–2.5)
ALP BLD-CCNC: 76 U/L (ref 40–129)
ALT SERPL-CCNC: 22 U/L (ref 5–41)
ANION GAP SERPL CALCULATED.3IONS-SCNC: 12 MMOL/L (ref 9–17)
AST SERPL-CCNC: 33 U/L
BASOPHILS # BLD: 0 % (ref 0–2)
BASOPHILS ABSOLUTE: 0 K/UL (ref 0–0.2)
BILIRUB SERPL-MCNC: 0.31 MG/DL (ref 0.3–1.2)
BILIRUBIN DIRECT: 0.08 MG/DL
BILIRUBIN, INDIRECT: 0.23 MG/DL (ref 0–1)
BUN BLDV-MCNC: 74 MG/DL (ref 8–23)
CALCIUM SERPL-MCNC: 9.4 MG/DL (ref 8.6–10.4)
CHLORIDE BLD-SCNC: 102 MMOL/L (ref 98–107)
CO2: 21 MMOL/L (ref 20–31)
CREAT SERPL-MCNC: 1.69 MG/DL (ref 0.7–1.2)
EOSINOPHILS RELATIVE PERCENT: 0 % (ref 1–4)
GFR AFRICAN AMERICAN: 47 ML/MIN
GFR NON-AFRICAN AMERICAN: 39 ML/MIN
GFR SERPL CREATININE-BSD FRML MDRD: ABNORMAL ML/MIN/{1.73_M2}
GLUCOSE BLD-MCNC: 153 MG/DL (ref 70–99)
HCT VFR BLD CALC: 14.9 % (ref 41–53)
HEMOGLOBIN: 4.9 G/DL (ref 13.5–17.5)
INR BLD: 1.3
LIPASE: 61 U/L (ref 13–60)
LYMPHOCYTES # BLD: 12 % (ref 24–44)
MAGNESIUM: 1.8 MG/DL (ref 1.6–2.6)
MCH RBC QN AUTO: 30.9 PG (ref 26–34)
MCHC RBC AUTO-ENTMCNC: 32.8 G/DL (ref 31–37)
MCV RBC AUTO: 94.1 FL (ref 80–100)
MONOCYTES # BLD: 5 % (ref 1–7)
MORPHOLOGY: ABNORMAL
MORPHOLOGY: ABNORMAL
PARTIAL THROMBOPLASTIN TIME: 20.7 SEC (ref 21.3–31.3)
PDW BLD-RTO: 15.6 % (ref 12.5–15.4)
PLATELET # BLD: 113 K/UL (ref 140–450)
PMV BLD AUTO: 7.4 FL (ref 6–12)
POTASSIUM SERPL-SCNC: 5.1 MMOL/L (ref 3.7–5.3)
PRO-BNP: 2810 PG/ML
PROTHROMBIN TIME: 13.5 SEC (ref 9.4–12.6)
RBC # BLD: 1.59 M/UL (ref 4.5–5.9)
SEG NEUTROPHILS: 83 % (ref 36–66)
SEGMENTED NEUTROPHILS ABSOLUTE COUNT: 6.97 K/UL (ref 1.8–7.7)
SODIUM BLD-SCNC: 135 MMOL/L (ref 135–144)
TOTAL PROTEIN: 5.1 G/DL (ref 6.4–8.3)
TROPONIN, HIGH SENSITIVITY: 59 NG/L (ref 0–22)
TROPONIN, HIGH SENSITIVITY: 66 NG/L (ref 0–22)
WBC # BLD: 8.4 K/UL (ref 3.5–11)

## 2022-07-17 PROCEDURE — 83880 ASSAY OF NATRIURETIC PEPTIDE: CPT

## 2022-07-17 PROCEDURE — 71045 X-RAY EXAM CHEST 1 VIEW: CPT

## 2022-07-17 PROCEDURE — 86920 COMPATIBILITY TEST SPIN: CPT

## 2022-07-17 PROCEDURE — 83735 ASSAY OF MAGNESIUM: CPT

## 2022-07-17 PROCEDURE — 83540 ASSAY OF IRON: CPT

## 2022-07-17 PROCEDURE — 86900 BLOOD TYPING SEROLOGIC ABO: CPT

## 2022-07-17 PROCEDURE — 99285 EMERGENCY DEPT VISIT HI MDM: CPT

## 2022-07-17 PROCEDURE — 2060000000 HC ICU INTERMEDIATE R&B

## 2022-07-17 PROCEDURE — 86901 BLOOD TYPING SEROLOGIC RH(D): CPT

## 2022-07-17 PROCEDURE — P9016 RBC LEUKOCYTES REDUCED: HCPCS

## 2022-07-17 PROCEDURE — 85025 COMPLETE CBC W/AUTO DIFF WBC: CPT

## 2022-07-17 PROCEDURE — 2580000003 HC RX 258: Performed by: EMERGENCY MEDICINE

## 2022-07-17 PROCEDURE — 85610 PROTHROMBIN TIME: CPT

## 2022-07-17 PROCEDURE — 83690 ASSAY OF LIPASE: CPT

## 2022-07-17 PROCEDURE — 80048 BASIC METABOLIC PNL TOTAL CA: CPT

## 2022-07-17 PROCEDURE — 93005 ELECTROCARDIOGRAM TRACING: CPT | Performed by: EMERGENCY MEDICINE

## 2022-07-17 PROCEDURE — 86850 RBC ANTIBODY SCREEN: CPT

## 2022-07-17 PROCEDURE — 36415 COLL VENOUS BLD VENIPUNCTURE: CPT

## 2022-07-17 PROCEDURE — 80076 HEPATIC FUNCTION PANEL: CPT

## 2022-07-17 PROCEDURE — 85730 THROMBOPLASTIN TIME PARTIAL: CPT

## 2022-07-17 PROCEDURE — 82728 ASSAY OF FERRITIN: CPT

## 2022-07-17 PROCEDURE — 83550 IRON BINDING TEST: CPT

## 2022-07-17 PROCEDURE — 84484 ASSAY OF TROPONIN QUANT: CPT

## 2022-07-17 RX ORDER — SODIUM CHLORIDE 9 MG/ML
INJECTION, SOLUTION INTRAVENOUS CONTINUOUS
Status: DISCONTINUED | OUTPATIENT
Start: 2022-07-18 | End: 2022-07-21

## 2022-07-17 RX ORDER — ACETAMINOPHEN 650 MG/1
650 SUPPOSITORY RECTAL EVERY 6 HOURS PRN
Status: DISCONTINUED | OUTPATIENT
Start: 2022-07-17 | End: 2022-07-21 | Stop reason: HOSPADM

## 2022-07-17 RX ORDER — ESCITALOPRAM OXALATE 10 MG/1
10 TABLET ORAL DAILY
Status: DISCONTINUED | OUTPATIENT
Start: 2022-07-18 | End: 2022-07-21 | Stop reason: HOSPADM

## 2022-07-17 RX ORDER — ONDANSETRON 2 MG/ML
4 INJECTION INTRAMUSCULAR; INTRAVENOUS EVERY 6 HOURS PRN
Status: DISCONTINUED | OUTPATIENT
Start: 2022-07-17 | End: 2022-07-21 | Stop reason: HOSPADM

## 2022-07-17 RX ORDER — SODIUM CHLORIDE 0.9 % (FLUSH) 0.9 %
5-40 SYRINGE (ML) INJECTION EVERY 12 HOURS SCHEDULED
Status: DISCONTINUED | OUTPATIENT
Start: 2022-07-18 | End: 2022-07-21 | Stop reason: HOSPADM

## 2022-07-17 RX ORDER — ONDANSETRON 4 MG/1
4 TABLET, ORALLY DISINTEGRATING ORAL EVERY 8 HOURS PRN
Status: DISCONTINUED | OUTPATIENT
Start: 2022-07-17 | End: 2022-07-21 | Stop reason: HOSPADM

## 2022-07-17 RX ORDER — SODIUM CHLORIDE 0.9 % (FLUSH) 0.9 %
5-40 SYRINGE (ML) INJECTION PRN
Status: DISCONTINUED | OUTPATIENT
Start: 2022-07-17 | End: 2022-07-21 | Stop reason: HOSPADM

## 2022-07-17 RX ORDER — 0.9 % SODIUM CHLORIDE 0.9 %
1000 INTRAVENOUS SOLUTION INTRAVENOUS ONCE
Status: COMPLETED | OUTPATIENT
Start: 2022-07-17 | End: 2022-07-17

## 2022-07-17 RX ORDER — SODIUM CHLORIDE 9 MG/ML
INJECTION, SOLUTION INTRAVENOUS PRN
Status: DISCONTINUED | OUTPATIENT
Start: 2022-07-17 | End: 2022-07-21 | Stop reason: HOSPADM

## 2022-07-17 RX ORDER — ACETAMINOPHEN 325 MG/1
650 TABLET ORAL EVERY 6 HOURS PRN
Status: DISCONTINUED | OUTPATIENT
Start: 2022-07-17 | End: 2022-07-21 | Stop reason: HOSPADM

## 2022-07-17 RX ORDER — ATORVASTATIN CALCIUM 40 MG/1
40 TABLET, FILM COATED ORAL DAILY
Status: DISCONTINUED | OUTPATIENT
Start: 2022-07-18 | End: 2022-07-21 | Stop reason: HOSPADM

## 2022-07-17 RX ADMIN — SODIUM CHLORIDE 1000 ML: 9 INJECTION, SOLUTION INTRAVENOUS at 20:53

## 2022-07-17 ASSESSMENT — ENCOUNTER SYMPTOMS
DIARRHEA: 1
CHEST TIGHTNESS: 0

## 2022-07-17 ASSESSMENT — PAIN - FUNCTIONAL ASSESSMENT: PAIN_FUNCTIONAL_ASSESSMENT: NONE - DENIES PAIN

## 2022-07-17 NOTE — Clinical Note
Discharge Plan[de-identified] Home with Office Follow-up
med history complete, reviewed medications with patient and confirmed with doctor first med profile, all medication related questions answered

## 2022-07-18 ENCOUNTER — ANESTHESIA EVENT (OUTPATIENT)
Dept: OPERATING ROOM | Age: 87
DRG: 378 | End: 2022-07-18
Payer: MEDICARE

## 2022-07-18 ENCOUNTER — ANESTHESIA (OUTPATIENT)
Dept: OPERATING ROOM | Age: 87
DRG: 378 | End: 2022-07-18
Payer: MEDICARE

## 2022-07-18 PROBLEM — E78.5 HYPERLIPIDEMIA: Status: ACTIVE | Noted: 2017-03-06

## 2022-07-18 PROBLEM — F32.A DEPRESSION: Status: ACTIVE | Noted: 2022-07-18

## 2022-07-18 LAB
ALBUMIN SERPL-MCNC: 3.3 G/DL (ref 3.5–5.2)
ALBUMIN/GLOBULIN RATIO: 2.8 (ref 1–2.5)
ALP BLD-CCNC: 63 U/L (ref 40–129)
ALT SERPL-CCNC: 19 U/L (ref 5–41)
ANION GAP SERPL CALCULATED.3IONS-SCNC: 8 MMOL/L (ref 9–17)
AST SERPL-CCNC: 26 U/L
BILIRUB SERPL-MCNC: 0.42 MG/DL (ref 0.3–1.2)
BUN BLDV-MCNC: 71 MG/DL (ref 8–23)
CALCIUM SERPL-MCNC: 8.9 MG/DL (ref 8.6–10.4)
CHLORIDE BLD-SCNC: 108 MMOL/L (ref 98–107)
CO2: 22 MMOL/L (ref 20–31)
CREAT SERPL-MCNC: 1.58 MG/DL (ref 0.7–1.2)
EKG ATRIAL RATE: 69 BPM
EKG P AXIS: 38 DEGREES
EKG P-R INTERVAL: 124 MS
EKG Q-T INTERVAL: 448 MS
EKG QRS DURATION: 154 MS
EKG QTC CALCULATION (BAZETT): 480 MS
EKG R AXIS: -24 DEGREES
EKG T AXIS: 93 DEGREES
EKG VENTRICULAR RATE: 69 BPM
FERRITIN: 13 NG/ML (ref 30–400)
GFR AFRICAN AMERICAN: 51 ML/MIN
GFR NON-AFRICAN AMERICAN: 42 ML/MIN
GFR SERPL CREATININE-BSD FRML MDRD: ABNORMAL ML/MIN/{1.73_M2}
GLUCOSE BLD-MCNC: 131 MG/DL (ref 70–99)
HCT VFR BLD CALC: 18.4 % (ref 41–53)
HCT VFR BLD CALC: 21.7 % (ref 41–53)
HCT VFR BLD CALC: 22.2 % (ref 41–53)
HCT VFR BLD CALC: 23.1 % (ref 41–53)
HEMOGLOBIN: 6.1 G/DL (ref 13.5–17.5)
HEMOGLOBIN: 7.3 G/DL (ref 13.5–17.5)
HEMOGLOBIN: 7.3 G/DL (ref 13.5–17.5)
HEMOGLOBIN: 7.7 G/DL (ref 13.5–17.5)
INR BLD: 1.3
IRON SATURATION: 6 % (ref 20–55)
IRON: 22 UG/DL (ref 59–158)
PARTIAL THROMBOPLASTIN TIME: 22.3 SEC (ref 21.3–31.3)
POTASSIUM SERPL-SCNC: 4.8 MMOL/L (ref 3.7–5.3)
PROTHROMBIN TIME: 13.3 SEC (ref 9.4–12.6)
SODIUM BLD-SCNC: 138 MMOL/L (ref 135–144)
TOTAL IRON BINDING CAPACITY: 340 UG/DL (ref 250–450)
TOTAL PROTEIN: 4.5 G/DL (ref 6.4–8.3)
UNSATURATED IRON BINDING CAPACITY: 318 UG/DL (ref 112–347)

## 2022-07-18 PROCEDURE — P9040 RBC LEUKOREDUCED IRRADIATED: HCPCS

## 2022-07-18 PROCEDURE — 6360000002 HC RX W HCPCS

## 2022-07-18 PROCEDURE — P9016 RBC LEUKOCYTES REDUCED: HCPCS

## 2022-07-18 PROCEDURE — 85014 HEMATOCRIT: CPT

## 2022-07-18 PROCEDURE — A4216 STERILE WATER/SALINE, 10 ML: HCPCS | Performed by: STUDENT IN AN ORGANIZED HEALTH CARE EDUCATION/TRAINING PROGRAM

## 2022-07-18 PROCEDURE — 85610 PROTHROMBIN TIME: CPT

## 2022-07-18 PROCEDURE — 2580000003 HC RX 258: Performed by: CLINICAL NURSE SPECIALIST

## 2022-07-18 PROCEDURE — 2580000003 HC RX 258: Performed by: STUDENT IN AN ORGANIZED HEALTH CARE EDUCATION/TRAINING PROGRAM

## 2022-07-18 PROCEDURE — 6360000002 HC RX W HCPCS: Performed by: STUDENT IN AN ORGANIZED HEALTH CARE EDUCATION/TRAINING PROGRAM

## 2022-07-18 PROCEDURE — 6360000002 HC RX W HCPCS: Performed by: INTERNAL MEDICINE

## 2022-07-18 PROCEDURE — 3700000001 HC ADD 15 MINUTES (ANESTHESIA): Performed by: INTERNAL MEDICINE

## 2022-07-18 PROCEDURE — 2720000010 HC SURG SUPPLY STERILE: Performed by: INTERNAL MEDICINE

## 2022-07-18 PROCEDURE — 6370000000 HC RX 637 (ALT 250 FOR IP): Performed by: STUDENT IN AN ORGANIZED HEALTH CARE EDUCATION/TRAINING PROGRAM

## 2022-07-18 PROCEDURE — 80053 COMPREHEN METABOLIC PANEL: CPT

## 2022-07-18 PROCEDURE — 3609013000 HC EGD TRANSORAL CONTROL BLEEDING ANY METHOD: Performed by: INTERNAL MEDICINE

## 2022-07-18 PROCEDURE — 3E0G8GC INTRODUCTION OF OTHER THERAPEUTIC SUBSTANCE INTO UPPER GI, VIA NATURAL OR ARTIFICIAL OPENING ENDOSCOPIC: ICD-10-PCS | Performed by: INTERNAL MEDICINE

## 2022-07-18 PROCEDURE — 2060000000 HC ICU INTERMEDIATE R&B

## 2022-07-18 PROCEDURE — 2580000003 HC RX 258: Performed by: NURSE PRACTITIONER

## 2022-07-18 PROCEDURE — 6370000000 HC RX 637 (ALT 250 FOR IP): Performed by: INTERNAL MEDICINE

## 2022-07-18 PROCEDURE — 2500000003 HC RX 250 WO HCPCS: Performed by: NURSE ANESTHETIST, CERTIFIED REGISTERED

## 2022-07-18 PROCEDURE — 7100000000 HC PACU RECOVERY - FIRST 15 MIN: Performed by: INTERNAL MEDICINE

## 2022-07-18 PROCEDURE — 86900 BLOOD TYPING SEROLOGIC ABO: CPT

## 2022-07-18 PROCEDURE — C9113 INJ PANTOPRAZOLE SODIUM, VIA: HCPCS | Performed by: STUDENT IN AN ORGANIZED HEALTH CARE EDUCATION/TRAINING PROGRAM

## 2022-07-18 PROCEDURE — 99222 1ST HOSP IP/OBS MODERATE 55: CPT | Performed by: STUDENT IN AN ORGANIZED HEALTH CARE EDUCATION/TRAINING PROGRAM

## 2022-07-18 PROCEDURE — 85018 HEMOGLOBIN: CPT

## 2022-07-18 PROCEDURE — 3700000000 HC ANESTHESIA ATTENDED CARE: Performed by: INTERNAL MEDICINE

## 2022-07-18 PROCEDURE — 94760 N-INVAS EAR/PLS OXIMETRY 1: CPT

## 2022-07-18 PROCEDURE — 6360000002 HC RX W HCPCS: Performed by: NURSE ANESTHETIST, CERTIFIED REGISTERED

## 2022-07-18 PROCEDURE — 99222 1ST HOSP IP/OBS MODERATE 55: CPT | Performed by: INTERNAL MEDICINE

## 2022-07-18 PROCEDURE — 36430 TRANSFUSION BLD/BLD COMPNT: CPT

## 2022-07-18 PROCEDURE — 7100000001 HC PACU RECOVERY - ADDTL 15 MIN: Performed by: INTERNAL MEDICINE

## 2022-07-18 PROCEDURE — 36415 COLL VENOUS BLD VENIPUNCTURE: CPT

## 2022-07-18 PROCEDURE — 85730 THROMBOPLASTIN TIME PARTIAL: CPT

## 2022-07-18 PROCEDURE — 0W3P8ZZ CONTROL BLEEDING IN GASTROINTESTINAL TRACT, VIA NATURAL OR ARTIFICIAL OPENING ENDOSCOPIC: ICD-10-PCS | Performed by: INTERNAL MEDICINE

## 2022-07-18 PROCEDURE — 2709999900 HC NON-CHARGEABLE SUPPLY: Performed by: INTERNAL MEDICINE

## 2022-07-18 RX ORDER — EPINEPHRINE 1 MG/ML
INJECTION, SOLUTION, CONCENTRATE INTRAVENOUS PRN
Status: DISCONTINUED | OUTPATIENT
Start: 2022-07-18 | End: 2022-07-18 | Stop reason: ALTCHOICE

## 2022-07-18 RX ORDER — LIDOCAINE HYDROCHLORIDE 10 MG/ML
INJECTION, SOLUTION EPIDURAL; INFILTRATION; INTRACAUDAL; PERINEURAL PRN
Status: DISCONTINUED | OUTPATIENT
Start: 2022-07-18 | End: 2022-07-18 | Stop reason: SDUPTHER

## 2022-07-18 RX ORDER — LIDOCAINE HYDROCHLORIDE 10 MG/ML
1 INJECTION, SOLUTION EPIDURAL; INFILTRATION; INTRACAUDAL; PERINEURAL
Status: DISCONTINUED | OUTPATIENT
Start: 2022-07-18 | End: 2022-07-18 | Stop reason: HOSPADM

## 2022-07-18 RX ORDER — SODIUM CHLORIDE 0.9 % (FLUSH) 0.9 %
5-40 SYRINGE (ML) INJECTION EVERY 12 HOURS SCHEDULED
Status: DISCONTINUED | OUTPATIENT
Start: 2022-07-18 | End: 2022-07-18 | Stop reason: HOSPADM

## 2022-07-18 RX ORDER — ARIPIPRAZOLE 2 MG/1
2 TABLET ORAL DAILY
Status: DISCONTINUED | OUTPATIENT
Start: 2022-07-18 | End: 2022-07-21 | Stop reason: HOSPADM

## 2022-07-18 RX ORDER — PROPOFOL 10 MG/ML
INJECTION, EMULSION INTRAVENOUS PRN
Status: DISCONTINUED | OUTPATIENT
Start: 2022-07-18 | End: 2022-07-18 | Stop reason: SDUPTHER

## 2022-07-18 RX ORDER — SODIUM CHLORIDE 9 MG/ML
INJECTION, SOLUTION INTRAVENOUS PRN
Status: COMPLETED | OUTPATIENT
Start: 2022-07-18 | End: 2022-07-18

## 2022-07-18 RX ORDER — SODIUM CHLORIDE 9 MG/ML
INJECTION, SOLUTION INTRAVENOUS PRN
Status: DISCONTINUED | OUTPATIENT
Start: 2022-07-18 | End: 2022-07-18 | Stop reason: HOSPADM

## 2022-07-18 RX ORDER — SODIUM CHLORIDE 0.9 % (FLUSH) 0.9 %
5-40 SYRINGE (ML) INJECTION PRN
Status: DISCONTINUED | OUTPATIENT
Start: 2022-07-18 | End: 2022-07-18 | Stop reason: HOSPADM

## 2022-07-18 RX ORDER — SUCRALFATE 1 G/1
1 TABLET ORAL EVERY 6 HOURS SCHEDULED
Status: DISCONTINUED | OUTPATIENT
Start: 2022-07-18 | End: 2022-07-21 | Stop reason: HOSPADM

## 2022-07-18 RX ORDER — SODIUM CHLORIDE, SODIUM LACTATE, POTASSIUM CHLORIDE, CALCIUM CHLORIDE 600; 310; 30; 20 MG/100ML; MG/100ML; MG/100ML; MG/100ML
INJECTION, SOLUTION INTRAVENOUS CONTINUOUS
Status: DISCONTINUED | OUTPATIENT
Start: 2022-07-18 | End: 2022-07-18

## 2022-07-18 RX ORDER — PHENYLEPHRINE HCL IN 0.9% NACL 1 MG/10 ML
SYRINGE (ML) INTRAVENOUS PRN
Status: DISCONTINUED | OUTPATIENT
Start: 2022-07-18 | End: 2022-07-18 | Stop reason: SDUPTHER

## 2022-07-18 RX ADMIN — SODIUM CHLORIDE, PRESERVATIVE FREE 10 ML: 5 INJECTION INTRAVENOUS at 11:51

## 2022-07-18 RX ADMIN — EPINEPHRINE: 1 INJECTION INTRAMUSCULAR; INTRAVENOUS; SUBCUTANEOUS at 14:35

## 2022-07-18 RX ADMIN — SUCRALFATE 1 G: 1 TABLET ORAL at 15:35

## 2022-07-18 RX ADMIN — PROPOFOL 10 MG: 10 INJECTION, EMULSION INTRAVENOUS at 12:57

## 2022-07-18 RX ADMIN — PROPOFOL 20 MG: 10 INJECTION, EMULSION INTRAVENOUS at 13:03

## 2022-07-18 RX ADMIN — Medication 50 MCG: at 13:03

## 2022-07-18 RX ADMIN — SODIUM CHLORIDE, PRESERVATIVE FREE 40 MG: 5 INJECTION INTRAVENOUS at 22:05

## 2022-07-18 RX ADMIN — PROPOFOL 30 MG: 10 INJECTION, EMULSION INTRAVENOUS at 12:49

## 2022-07-18 RX ADMIN — SODIUM CHLORIDE, PRESERVATIVE FREE 40 MG: 5 INJECTION INTRAVENOUS at 15:35

## 2022-07-18 RX ADMIN — SODIUM CHLORIDE, PRESERVATIVE FREE 10 ML: 5 INJECTION INTRAVENOUS at 14:26

## 2022-07-18 RX ADMIN — PROPOFOL 10 MG: 10 INJECTION, EMULSION INTRAVENOUS at 12:52

## 2022-07-18 RX ADMIN — PROPOFOL 10 MG: 10 INJECTION, EMULSION INTRAVENOUS at 12:55

## 2022-07-18 RX ADMIN — PROPOFOL 20 MG: 10 INJECTION, EMULSION INTRAVENOUS at 13:01

## 2022-07-18 RX ADMIN — PROPOFOL 10 MG: 10 INJECTION, EMULSION INTRAVENOUS at 12:58

## 2022-07-18 RX ADMIN — ARIPIPRAZOLE 2 MG: 2 TABLET ORAL at 15:35

## 2022-07-18 RX ADMIN — PROPOFOL 10 MG: 10 INJECTION, EMULSION INTRAVENOUS at 12:59

## 2022-07-18 RX ADMIN — PROPOFOL 10 MG: 10 INJECTION, EMULSION INTRAVENOUS at 13:00

## 2022-07-18 RX ADMIN — LIDOCAINE HYDROCHLORIDE 50 MG: 10 INJECTION, SOLUTION EPIDURAL; INFILTRATION; INTRACAUDAL; PERINEURAL at 12:49

## 2022-07-18 RX ADMIN — SODIUM CHLORIDE: 9 INJECTION, SOLUTION INTRAVENOUS at 14:26

## 2022-07-18 RX ADMIN — PROPOFOL 10 MG: 10 INJECTION, EMULSION INTRAVENOUS at 12:54

## 2022-07-18 RX ADMIN — SODIUM CHLORIDE: 9 INJECTION, SOLUTION INTRAVENOUS at 12:44

## 2022-07-18 RX ADMIN — PROPOFOL 20 MG: 10 INJECTION, EMULSION INTRAVENOUS at 13:05

## 2022-07-18 RX ADMIN — SUCRALFATE 1 G: 1 TABLET ORAL at 18:10

## 2022-07-18 RX ADMIN — SUCRALFATE 1 G: 1 TABLET ORAL at 23:36

## 2022-07-18 ASSESSMENT — PAIN - FUNCTIONAL ASSESSMENT: PAIN_FUNCTIONAL_ASSESSMENT: NONE - DENIES PAIN

## 2022-07-18 NOTE — ANESTHESIA PRE PROCEDURE
Department of Anesthesiology  Preprocedure Note       Name:  Jason Crawford   Age:  81 y.o.  :  1935                                          MRN:  [id number]         Date:  2022      Surgeon: Lord Alcala):  Julito Draper MD    Procedure: Procedure(s):  EGD BIOPSY    Medications prior to admission:   Prior to Admission medications    Medication Sig Start Date End Date Taking? Authorizing Provider   apixaban (ELIQUIS) 5 MG TABS tablet Take 5 mg by mouth in the morning and 5 mg before bedtime. Historical Provider, MD   atorvastatin (LIPITOR) 40 MG tablet Take 40 mg by mouth daily 19   Historical Provider, MD   escitalopram (LEXAPRO) 10 MG tablet Take 10 mg by mouth daily    Historical Provider, MD   metoprolol tartrate (LOPRESSOR) 25 MG tablet Take 25 mg by mouth in the morning and 25 mg before bedtime.  10/2/19   Historical Provider, MD   niacin (NIASPAN) 1000 MG extended release tablet Take 1,000 mg by mouth daily 19   Historical Provider, MD   Multiple Vitamins-Minerals (CENTRUM ADULTS PO) Take 1 tablet by mouth daily    Historical Provider, MD       Current medications:    Current Facility-Administered Medications   Medication Dose Route Frequency Provider Last Rate Last Admin    0.9 % sodium chloride infusion   IntraVENous PRN Chuyita Frye APRN - CNP        pantoprazole (PROTONIX) 40 mg in sodium chloride (PF) 10 mL injection  40 mg IntraVENous Q12H Daly Segura MD        0.9 % sodium chloride infusion   IntraVENous PRN Josephine Grigsby APRN - CNS        [Held by provider] apixaban (ELIQUIS) tablet 5 mg  5 mg Oral BID Josephineshyam Grigsby, APRN - CNS        atorvastatin (LIPITOR) tablet 40 mg  40 mg Oral Daily Josephine Natmadhavi, APRN - CNS        escitalopram (LEXAPRO) tablet 10 mg  10 mg Oral Daily Josephine Natmadhavi, APRN - CNS        [Held by provider] metoprolol tartrate (LOPRESSOR) tablet 25 mg  25 mg Oral BID Josephine Natmadhavi APRN - CNS        0.9 % sodium chloride infusion IntraVENous Continuous Lavonne Kvng, APRN - CNS   Held at 07/18/22 1954    sodium chloride flush 0.9 % injection 5-40 mL  5-40 mL IntraVENous 2 times per day Lavonne Seligman, APRN - CNS        sodium chloride flush 0.9 % injection 5-40 mL  5-40 mL IntraVENous PRN Lavonne Kvng, APRN - CNS        0.9 % sodium chloride infusion   IntraVENous PRN Lavonne Seligman, APRN - CNS        ondansetron (ZOFRAN-ODT) disintegrating tablet 4 mg  4 mg Oral Q8H PRN Lavonne Seligman, APRN - CNS        Or    ondansetron (ZOFRAN) injection 4 mg  4 mg IntraVENous Q6H PRN Lavonne Kvng, APRN - CNS        acetaminophen (TYLENOL) tablet 650 mg  650 mg Oral Q6H PRN Lavonne Seligman, APRN - CNS        Or    acetaminophen (TYLENOL) suppository 650 mg  650 mg Rectal Q6H PRN Lavonne Kvng, APRN - CNS           Allergies:  No Known Allergies    Problem List:    Patient Active Problem List   Diagnosis Code    Stenosis of carotid artery I65.29    S/p TAVR (transcatheter aortic valve replacement), bioprosthetic Z95.3    Renal impairment N28.9    Redundant prepuce and phimosis N47.8, N47.1    Pure hypercholesterolemia E78.00    Phimosis N47.1    Mixed hyperlipidemia E78.2    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    Malignant lymphoma, lymphocytic, intermediate differentiation, diffuse ([facility] [address] C85.80    Long term (current) use of anticoagulants Z79.01    Impotence of organic origin N52.9    Impaired fasting glucose R73.01    History of squamous cell carcinoma of skin Z85.828    Elevated PSA R97.20    Coronary atherosclerosis I25.10    Cerebrovascular accident (CVA) ([facility] [address] I63.9    Benign essential hypertension I10    Atrial fibrillation (HCC) I48.91    Aphasia R47.01    Anemia D64.9    Actinic keratosis L57.0    Dermatophytosis, body B35.4    GIB (gastrointestinal bleeding) K92.2       Past Medical History:        Diagnosis Date    Heart attack ([facility] [address] 2003    High cholesterol     Hypertension     Stroke ([facility] [address] 2019 Past Surgical History:        Procedure Laterality Date    ARTERIAL BYPASS SURGRY  2004    CARDIAC CATHETERIZATION  2003    CARDIAC VALVE REPLACEMENT  09/2019    MOUTH BIOPSY  09/2019    tonsil        Social History:    Social History     Tobacco Use    Smoking status: Never    Smokeless tobacco: Never   Substance Use Topics    Alcohol use: Not Currently                                Counseling given: Not Answered      Vital Signs (Current):   Vitals:    07/18/22 0346 07/18/22 0554 07/18/22 0612 07/18/22 0720   BP: 110/62 (!) 127/46 (!) 116/55 (!) 109/55   Pulse: 71 66 67 69   Resp: 16 18 18 18   Temp: 98 °F (36.7 °C) 98.1 °F (36.7 °C) 98.1 °F (36.7 °C) 98.2 °F (36.8 °C)   TempSrc: Oral Oral Oral Oral   SpO2: 98% 97% 96% 96%   Weight:       Height:                                                  BP Readings from Last 3 Encounters:   07/18/22 (!) 109/55   06/15/21 136/63   06/02/20 (!) 144/62       NPO Status:  after MN                                                                               BMI:   Wt Readings from Last 3 Encounters:   07/17/22 203 lb 7.8 oz (92.3 kg)   06/15/21 201 lb 3.2 oz (91.3 kg)   06/02/20 201 lb 6.4 oz (91.4 kg)     Body mass index is 29.2 kg/m².     CBC:   Lab Results   Component Value Date/Time    WBC 8.4 07/17/2022 08:26 PM    RBC 1.59 07/17/2022 08:26 PM    HGB 6.1 07/18/2022 04:46 AM    HCT 18.4 07/18/2022 04:46 AM    MCV 94.1 07/17/2022 08:26 PM    RDW 15.6 07/17/2022 08:26 PM     07/17/2022 08:26 PM       CMP:   Lab Results   Component Value Date/Time     07/18/2022 04:46 AM    K 4.8 07/18/2022 04:46 AM     07/18/2022 04:46 AM    CO2 22 07/18/2022 04:46 AM    BUN 71 07/18/2022 04:46 AM    CREATININE 1.58 07/18/2022 04:46 AM    GFRAA 51 07/18/2022 04:46 AM    LABGLOM 42 07/18/2022 04:46 AM    GLUCOSE 131 07/18/2022 04:46 AM    PROT 4.5 07/18/2022 04:46 AM    CALCIUM 8.9 07/18/2022 04:46 AM    BILITOT 0.42 07/18/2022 04:46 AM    ALKPHOS 63 07/18/2022 04:46 AM    AST 26 07/18/2022 04:46 AM    ALT 19 07/18/2022 04:46 AM       POC Tests: No results for input(s): POCGLU, POCNA, POCK, POCCL, POCBUN, POCHEMO, POCHCT in the last 72 hours. Coags:   Lab Results   Component Value Date/Time    PROTIME 13.3 07/18/2022 04:46 AM    INR 1.3 07/18/2022 04:46 AM    APTT 22.3 07/18/2022 04:46 AM       HCG (If Applicable): No results found for: PREGTESTUR, PREGSERUM, HCG, HCGQUANT     ABGs: No results found for: PHART, PO2ART, VOF9YWT, FQL1KSO, BEART, Y9YTXNRK     Type & Screen (If Applicable):  No results found for: LABABO, LABRH    Drug/Infectious Status (If Applicable):  No results found for: HIV, HEPCAB    COVID-19 Screening (If Applicable): No results found for: COVID19        Anesthesia Evaluation  Patient summary reviewed and Nursing notes reviewed no history of anesthetic complications:   Airway: Mallampati: II  TM distance: >3 FB   Neck ROM: full  Mouth opening: > = 3 FB   Dental:          Pulmonary:Negative Pulmonary ROS and normal exam  breath sounds clear to auscultation                             Cardiovascular:    (+) hypertension: no interval change, past MI: no interval change, CAD: no interval change, hyperlipidemia      ECG reviewed  Rhythm: regular  Rate: normal           Beta Blocker:  Dose within 24 Hrs      ROS comment: Hx Atrial fibrillation      Neuro/Psych:   (+) CVA:,             GI/Hepatic/Renal:            ROS comment: GIB (gastrointestinal bleeding). Endo/Other:    (+) malignancy/cancer. ROS comment: Malignant lymphoma, lymphocytic, intermediate differentiation, diffuse  squamous cell carcinoma of skin Abdominal:       Abdomen: soft. Vascular:   + PVD, aortic or cerebral, .        Other Findings:           Anesthesia Plan      MAC     ASA 4 - emergent     (Patient had 3 units of blood transfusion preop, Hb 7.7  DNR-CCA, no intubation, no CPR and shock per patient.)        Anesthetic plan and risks discussed with patient and

## 2022-07-18 NOTE — ED NOTES
Pt to ER with daughter, to room per wheelchair, transferred to bed with assist. Daughter reports pt has h/o CVA with expressive dysphasia, so daughter is telling pt story. She reports pt with generalized weakness fatigue and decreased appetite since Wednesday, and diarrhea that started today. Pt denies pain. Daughter also reports episode of bleeding to right lower leg after bumping it, reports he bled for a day until she became aware of the situation, and stopped the bleeding, unsure if related to symptoms, does report pt is on blood thinner.  Pt calm, cooperative, rspers even non labored, skin warm pale, no immediate distress, here for eval.      Quillian Gosselin, RN  07/17/22 1612

## 2022-07-18 NOTE — ED PROVIDER NOTES
[facility] ED  EMERGENCY DEPARTMENT ENCOUNTER      Pt Name: Jerri Sanchez  MRN: [id number]  Anujgfluz 1935  Date of evaluation: 7/17/2022  Provider: Lina Mayo MD    [address] [place] [address]     Chief Complaint   Patient presents with    Fatigue     Decreased appetite starting Tuesday     Diarrhea         HISTORY OF PRESENT ILLNESS   (Location/Symptom, Timing/Onset, Context/Setting,Quality, Duration, Modifying Factors, Severity)  Note limiting factors. Jerri Sanchez is a 81 y.o. male who presents to the emergency department accompanied by his daughter who provides the history since the patient has expressive aphasia from a previous stroke. Patient has had a poor appetite for the last 4 or 5 days and has had poor oral intake. He developed profuse diarrhea today and family thought that he looked very pale and weak. Patient has had previous TAVR with a bioprosthetic valve and a history of atrial fibrillation. He is on long-term anticoagulant therapy. The history is provided by the patient, a relative and medical records. Nursing Notes werereviewed. REVIEW OF SYSTEMS    (2-9 systems for level 4, 10 or more for level 5)     Review of Systems   Constitutional:  Positive for fatigue. Negative for fever. Respiratory:  Negative for chest tightness. Cardiovascular:  Negative for chest pain. Gastrointestinal:  Positive for diarrhea. Genitourinary:  Negative for dysuria. Neurological:  Positive for speech difficulty and weakness. All other systems reviewed and are negative. Except as noted above the remainder of the review of systems was reviewed and negative.        PAST MEDICAL HISTORY     Past Medical History:   Diagnosis Date    Heart attack ([address] 2003    High cholesterol     Hypertension     Stroke ([facility] [address] 2019         SURGICALHISTORY       Past Surgical History:   Procedure Laterality Date    ARTERIAL BYPASS SURGRY  2004    CARDIAC CATHETERIZATION  2003    CARDIAC VALVE REPLACEMENT  09/2019    MOUTH BIOPSY  09/2019    tonsil          CURRENT MEDICATIONS       Previous Medications    APIXABAN (ELIQUIS) 5 MG TABS TABLET    Take 5 mg by mouth in the morning and 5 mg before bedtime. ATORVASTATIN (LIPITOR) 40 MG TABLET    Take 40 mg by mouth daily    ESCITALOPRAM (LEXAPRO) 10 MG TABLET    Take 10 mg by mouth daily    METOPROLOL TARTRATE (LOPRESSOR) 25 MG TABLET    Take 25 mg by mouth in the morning and 25 mg before bedtime. MULTIPLE VITAMINS-MINERALS (CENTRUM ADULTS PO)    Take 1 tablet by mouth daily    NIACIN (NIASPAN) 1000 MG EXTENDED RELEASE TABLET    Take 1,000 mg by mouth daily       ALLERGIES     Patient has no known allergies. FAMILY HISTORY       Family History   Problem Relation Age of Onset    Colon Cancer Mother     Other Father           SOCIAL HISTORY       Social History     Socioeconomic History    Marital status:      Spouse name: Roxanna Rivera     Number of children: None    Years of education: None    Highest education level: None   Tobacco Use    Smoking status: Never    Smokeless tobacco: Never   Vaping Use    Vaping Use: Never used   Substance and Sexual Activity    Alcohol use: Not Currently    Drug use: Never    Sexual activity: Not Currently       SCREENINGS             PHYSICAL EXAM    (up to 7 for level 4, 8 or more for level 5)     ED Triage Vitals [07/17/22 2019]   BP Temp Temp Source Heart Rate Resp SpO2 Height Weight   (!) 108/40 98.8 °F (37.1 °C) Oral 70 -- 100 % 5' 10\" (1.778 m) 190 lb (86.2 kg)       Physical Exam  Vitals reviewed. Constitutional:       General: He is not in acute distress. Appearance: He is ill-appearing. HENT:      Head: Normocephalic. Right Ear: External ear normal.      Left Ear: External ear normal.      Nose: Nose normal.      Mouth/Throat:      Mouth: Mucous membranes are moist.   Eyes:      General: No scleral icterus. Extraocular Movements: Extraocular movements intact.    Cardiovascular:      Rate and Rhythm: Normal rate and regular rhythm. Heart sounds: Murmur (Grade 2/6 systolic murmur heard over the precordium, loudest over the aortic area.) heard. Abdominal:      Palpations: Abdomen is soft. Tenderness: There is no abdominal tenderness. Musculoskeletal:         General: No tenderness. Cervical back: Neck supple. Right lower leg: No edema. Left lower leg: No edema. Skin:     General: Skin is warm and dry. Coloration: Skin is pale. Neurological:      General: No focal deficit present. DIAGNOSTIC RESULTS     EKG: All EKG's are interpreted by the Emergency Department Physician who either signs orCo-signs this chart in the absence of a cardiologist.    Sinus rhythm with rate of 69 beats a minute. Left bundle branch block pattern. An old EKG from March 2020 also showed left bundle branch block. RADIOLOGY:     Interpretation per the Radiologist below, ifavailable at the time of this note:    No orders to display         ED BEDSIDE ULTRASOUND:   Performed by ED Physician - none    LABS:  Labs Reviewed   CBC WITH AUTO DIFFERENTIAL - Abnormal; Notable for the following components:       Result Value    RBC 1.59 (*)     Hemoglobin 4.9 (*)     Hematocrit 14.9 (*)     RDW 15.6 (*)     Platelets 878 (*)     Seg Neutrophils 83 (*)     Lymphocytes 12 (*)     Eosinophils % 0 (*)     All other components within normal limits   BASIC METABOLIC PANEL - Abnormal; Notable for the following components:    Glucose 153 (*)     BUN 74 (*)     CREATININE 1.69 (*)     GFR Non- 39 (*)     GFR  47 (*)     All other components within normal limits   HEPATIC FUNCTION PANEL - Abnormal; Notable for the following components:     Total Protein 5.1 (*)     Albumin/Globulin Ratio 3.3 (*)     All other components within normal limits   LIPASE - Abnormal; Notable for the following components:    Lipase 61 (*)     All other components within normal limits   TROPONIN - Abnormal; Notable for the following components:    Troponin, High Sensitivity 66 (*)     All other components within normal limits   BRAIN NATRIURETIC PEPTIDE - Abnormal; Notable for the following components:    Pro-BNP 2,810 (*)     All other components within normal limits   C DIFF TOXIN/ANTIGEN   GASTROINTESTINAL PANEL, MOLECULAR   MAGNESIUM   TYPE AND SCREEN   PREPARE RBC (CROSSMATCH)       All other labs were within normal range ornot returned as of this dictation. EMERGENCY DEPARTMENT COURSE and DIFFERENTIAL DIAGNOSIS/MDM:   Vitals:    Vitals:    07/17/22 2019   BP: (!) 108/40   Pulse: 70   Temp: 98.8 °F (37.1 °C)   TempSrc: Oral   SpO2: 100%   Weight: 86.2 kg (190 lb)   Height: 5' 10\" (1.778 m)       ED Course as of 07/19/22 1409   Sun Jul 17, 2022 2114 Rectal examination reveals normal rectal tone. Firm stool is felt in the rectum and it is wine colored and grossly bloody. It is strongly Hemoccult positive. [SH]      ED Course User Index  [SH] Mimi Ryan MD       Hemoglobin is low at 4.9. That is being typed and crossed for 2 units of red cells to be transfused. Patient's family is agreeable with this. Completion of work-up is pending. Care is transferred to Dr. Tessie Jauregui at change of shift who will arrange for hospital admission. MDM    CONSULTS:  None    PROCEDURES:  Unlessotherwise noted below, none     Procedures    FINAL IMPRESSION    No diagnosis found. DISPOSITION/PLAN   DISPOSITION        PATIENT REFERRED TO:  No follow-up provider specified.     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 differentiation, diffuse ([facility] [address] C85.80    Long term (current) use of anticoagulants Z79.01    Impotence of organic origin N52.9    Impaired fasting glucose R73.01    History of squamous cell carcinoma of skin Z85.828    Elevated PSA R97.20    Coronary atherosclerosis I25.10    Cerebrovascular accident (CVA) ([facility] [address] I63.9    Benign essential hypertension I10    Atrial fibrillation (HCC) I48.91    Aphasia R47.01    Anemia D64.9    Actinic keratosis L57.0    Dermatophytosis, body B35.4           Summation      Patient Course: Transfer of care to oncoming physician. ED Medicationsadministered this visit:    Medications   0.9 % sodium chloride bolus (1,000 mLs IntraVENous New Bag 7/17/22 2053)   0.9 % sodium chloride infusion (has no administration in time range)       New Prescriptions from this visit:    New Prescriptions    No medications on file       Follow-up:  No follow-up provider specified. Final Impression: No diagnosis found.            (Please note that portions of this note were completed with a voice recognitionprogram.  Efforts were made to edit the dictations but occasionally words are mis-transcribed.)    Kashif Earl MD (electronically signed)  Attending Emergency Physician            Kashif Earl MD  07/19/22 9826

## 2022-07-18 NOTE — H&P
[facility]  Office: [address], DO, Xiomy Nyhan, DO, Rekha Andrew, DO, Angelo Armendariz Blood, DO, Maria Teresa Love MD, Tania Yin MD, Musa Rich MD, Mello Hernandez MD, Stefano Brandt MD, Femi Kelley MD, Yvonne Rachel MD, Breann Rider, DO, Christopher Larson MD,  Morena Pagan MD, Kamilah Camacho MD, Luca Montano, DO, Rancho Hamilton MD, Martita Christensen MD, Teodoro Oneil DO, Armando Mota MD, Red Amin MD, Sudha Snider, [facility], [facility], CNP, Kostas Priest, CNP, Kianna Frost, CNP, Dami Rao, CNP, Richa Caballero, CNP, Aiyana Thompson PA-C, Deacon Rizzo, [facility], Will Graevs, CNP, Cindi Mares, CNP, Galdino Adair, CNP, Alberta De La Cruz, CNS, Servando Becker, [facility], Ethan Smith, CNP, Mariposa Pulliam, CNP, Judson Laguna, [facility]         [facility]    HISTORY AND PHYSICAL EXAMINATION            Date:   7/18/2022  Patient name:  Coye December  Date of admission:  7/17/2022  8:11 PM  MRN:   [id number]  Account:  [de-identified]  YOB: 1935  PCP:    Vj Bailey MD  Room:   [facility]/NONE  Code Status:    DNR-CCA    Chief Complaint:     Chief Complaint   Patient presents with    Fatigue     Decreased appetite starting Tuesday     Diarrhea     History Obtained From:     patient, electronic medical record    History of Present Illness:     Coye December is a 81 y.o. male with a past medical history of aortic valve replacement, atrial fibrillation, depression and hyperlipidemia who presented to the emergency department on 7/17/2022 complaining of fatigue, diarrhea and dark stools. The patient is on Eliquis and has been taking Aleve twice daily for the past week. In the ED, the patient was pale and ill-appearing. CBC was remarkable for an acute blood loss anemia with a hemoglobin of 4.9. Stool was guaiac positive.  The patient is admitted to internal medicine for further management of acute blood loss anemia secondary to likely GI bleed. Past Medical History:     Past Medical History:   Diagnosis Date    Heart attack ([facility] [address] 2003    High cholesterol     Hypertension     Stroke [facility]) 2019        Past Surgical History:     Past Surgical History:   Procedure Laterality Date    ARTERIAL BYPASS SURGRY  2004    CARDIAC CATHETERIZATION  2003    CARDIAC VALVE REPLACEMENT  09/2019    MOUTH BIOPSY  09/2019    tonsil         Medications Prior to Admission:     Prior to Admission medications    Medication Sig Start Date End Date Taking? Authorizing Provider   apixaban (ELIQUIS) 5 MG TABS tablet Take 5 mg by mouth in the morning and 5 mg before bedtime. Historical Provider, MD   atorvastatin (LIPITOR) 40 MG tablet Take 40 mg by mouth daily 11/24/19   Historical Provider, MD   escitalopram (LEXAPRO) 10 MG tablet Take 10 mg by mouth daily    Historical Provider, MD   metoprolol tartrate (LOPRESSOR) 25 MG tablet Take 25 mg by mouth in the morning and 25 mg before bedtime. 10/2/19   Historical Provider, MD   niacin (NIASPAN) 1000 MG extended release tablet Take 1,000 mg by mouth daily 11/24/19   Historical Provider, MD   Multiple Vitamins-Minerals (CENTRUM ADULTS PO) Take 1 tablet by mouth daily    Historical Provider, MD        Allergies:     Patient has no known allergies. Social History:     Tobacco:    reports that he has never smoked. He has never used smokeless tobacco.  Alcohol:      reports that he does not currently use alcohol. Drug Use:  reports no history of drug use. Family History:     Family History   Problem Relation Age of Onset    Colon Cancer Mother     Other Father        Review of Systems:     Positive and Negative as described in HPI. CONSTITUTIONAL:  Positive for fatigue and anorexia.    HEENT:  negative for vision, hearing changes, runny nose, throat pain  RESPIRATORY:  negative for shortness of breath, cough, congestion, wheezing  CARDIOVASCULAR:  negative for through XII grossly intact  Skin: Pallor appreciated.    Extremities:  peripheral pulses palpable, no pedal edema or calf pain with palpation  Psych: normal affect     Investigations:      Laboratory Testing:  Recent Results (from the past 24 hour(s))   CBC with Auto Differential    Collection Time: 07/17/22  8:26 PM   Result Value Ref Range    WBC 8.4 3.5 - 11.0 k/uL    RBC 1.59 (L) 4.5 - 5.9 m/uL    Hemoglobin 4.9 (LL) 13.5 - 17.5 g/dL    Hematocrit 14.9 (L) 41 - 53 %    MCV 94.1 80 - 100 fL    MCH 30.9 26 - 34 pg    MCHC 32.8 31 - 37 g/dL    RDW 15.6 (H) 12.5 - 15.4 %    Platelets 283 (L) 085 - 450 k/uL    MPV 7.4 6.0 - 12.0 fL    Seg Neutrophils 83 (H) 36 - 66 %    Lymphocytes 12 (L) 24 - 44 %    Monocytes 5 1 - 7 %    Eosinophils % 0 (L) 1 - 4 %    Basophils 0 0 - 2 %    Segs Absolute 6.97 1.8 - 7.7 k/uL    Absolute Lymph # 1.01 1.0 - 4.8 k/uL    Absolute Mono # 0.42 0.1 - 0.8 k/uL    Absolute Eos # 0.00 0.0 - 0.4 k/uL    Basophils Absolute 0.00 0.0 - 0.2 k/uL    Morphology ANISOCYTOSIS PRESENT     Morphology 1+ POLYCHROMASIA    BMP    Collection Time: 07/17/22  8:26 PM   Result Value Ref Range    Glucose 153 (H) 70 - 99 mg/dL    BUN 74 (H) 8 - 23 mg/dL    CREATININE 1.69 (H) 0.70 - 1.20 mg/dL    Calcium 9.4 8.6 - 10.4 mg/dL    Sodium 135 135 - 144 mmol/L    Potassium 5.1 3.7 - 5.3 mmol/L    Chloride 102 98 - 107 mmol/L    CO2 21 20 - 31 mmol/L    Anion Gap 12 9 - 17 mmol/L    GFR Non-African American 39 (L) >60 mL/min    GFR  47 (L) >60 mL/min    GFR Comment         Hepatic Function Panel    Collection Time: 07/17/22  8:26 PM   Result Value Ref Range    Albumin 3.9 3.5 - 5.2 g/dL    Alkaline Phosphatase 76 40 - 129 U/L    ALT 22 5 - 41 U/L    AST 33 <40 U/L    Total Bilirubin 0.31 0.3 - 1.2 mg/dL    Bilirubin, Direct 0.08 <0.31 mg/dL    Bilirubin, Indirect 0.23 0.00 - 1.00 mg/dL    Total Protein 5.1 (L) 6.4 - 8.3 g/dL    Albumin/Globulin Ratio 3.3 (H) 1.0 - 2.5   Lipase    Collection Time: 07/17/22  8:26 PM   Result Value Ref Range    Lipase 61 (H) 13 - 60 U/L   Magnesium    Collection Time: 07/17/22  8:26 PM   Result Value Ref Range    Magnesium 1.8 1.6 - 2.6 mg/dL   Troponin    Collection Time: 07/17/22  8:26 PM   Result Value Ref Range    Troponin, High Sensitivity 66 (HH) 0 - 22 ng/L   Brain Natriuretic Peptide    Collection Time: 07/17/22  8:26 PM   Result Value Ref Range    Pro-BNP 2,810 (H) <300 pg/mL   Iron and TIBC    Collection Time: 07/17/22  8:26 PM   Result Value Ref Range    Iron 22 (L) 59 - 158 ug/dL    TIBC 340 250 - 450 ug/dL    Iron Saturation 6 (L) 20 - 55 %    UIBC 318 112 - 347 ug/dL   Ferritin    Collection Time: 07/17/22  8:26 PM   Result Value Ref Range    Ferritin 13 (L) 30 - 400 ng/mL   TYPE AND SCREEN    Collection Time: 07/17/22  8:49 PM   Result Value Ref Range    Expiration Date 07/20/2022,2359     Arm Band Number [id number]     ABO/Rh O POSITIVE     Antibody Screen NEGATIVE     Unit Number [id number]     Product Code Leukocyte Reduced Red Cell     Unit Divison 00     Dispense Status TRANSFUSED     Unit Issue Date/Time 220293808314     Product Code Blood Bank [id number]     Blood Bank Unit Type and Rh O POS     Blood Bank ISBT Product Blood Type 5100     Blood Bank Blood Product Expiration Date 419602049247     Transfusion Status OK TO TRANSFUSE     Crossmatch Result COMPATIBLE     Unit Number [id number]     Product Code Leukocyte Reduced Red Cell     Unit Divison 00     Dispense Status ISSUED     Unit Issue Date/Time 891263760973     Product Code Blood Bank [id number]     Blood Bank Unit Type and Rh O POS     Blood Bank ISBT Product Blood Type 5100     Blood Bank Blood Product Expiration Date 963396364841     Transfusion Status OK TO TRANSFUSE     Crossmatch Result COMPATIBLE     Unit Number [id number]     Product Code Leukocyte Reduced Irradiated Red Cell     Unit Divison 00     Dispense Status ISSUED     Unit Issue Date/Time 310903062590     Product Code Blood Bank X3669I15     Blood Bank Unit Type and Rh O POS     Blood Bank ISBT Product Blood Type 5100     Blood Bank Blood Product Expiration Date 971507832505     Transfusion Status OK TO TRANSFUSE     Crossmatch Result COMPATIBLE    Protime-INR    Collection Time: 07/17/22  8:49 PM   Result Value Ref Range    Protime 13.5 (H) 9.4 - 12.6 sec    INR 1.3    APTT    Collection Time: 07/17/22  8:49 PM   Result Value Ref Range    PTT 20.7 (L) 21.3 - 31.3 sec   EKG 12 Lead    Collection Time: 07/17/22  8:50 PM   Result Value Ref Range    Ventricular Rate 69 BPM    Atrial Rate 69 BPM    P-R Interval 124 ms    QRS Duration 154 ms    Q-T Interval 448 ms    QTc Calculation (Bazett) 480 ms    P Axis 38 degrees    R Axis -24 degrees    T Axis 93 degrees   Troponin    Collection Time: 07/17/22 10:45 PM   Result Value Ref Range    Troponin, High Sensitivity 59 (HH) 0 - 22 ng/L   Comprehensive Metabolic Panel w/ Reflex to MG    Collection Time: 07/18/22  4:46 AM   Result Value Ref Range    Glucose 131 (H) 70 - 99 mg/dL    BUN 71 (H) 8 - 23 mg/dL    CREATININE 1.58 (H) 0.70 - 1.20 mg/dL    Calcium 8.9 8.6 - 10.4 mg/dL    Sodium 138 135 - 144 mmol/L    Potassium 4.8 3.7 - 5.3 mmol/L    Chloride 108 (H) 98 - 107 mmol/L    CO2 22 20 - 31 mmol/L    Anion Gap 8 (L) 9 - 17 mmol/L    Alkaline Phosphatase 63 40 - 129 U/L    ALT 19 5 - 41 U/L    AST 26 <40 U/L    Total Bilirubin 0.42 0.3 - 1.2 mg/dL    Total Protein 4.5 (L) 6.4 - 8.3 g/dL    Albumin 3.3 (L) 3.5 - 5.2 g/dL    Albumin/Globulin Ratio 2.8 (H) 1.0 - 2.5    GFR Non-African American 42 (L) >60 mL/min    GFR  51 (L) >60 mL/min    GFR Comment         Protime-INR    Collection Time: 07/18/22  4:46 AM   Result Value Ref Range    Protime 13.3 (H) 9.4 - 12.6 sec    INR 1.3    APTT    Collection Time: 07/18/22  4:46 AM   Result Value Ref Range    PTT 22.3 21.3 - 31.3 sec   Hemoglobin and Hematocrit    Collection Time: 07/18/22  4:46 AM   Result Value Ref Range    Hemoglobin 6.1 (LL) 13.5 - 17.5 g/dL    Hematocrit 18.4 (L) 41 - 53 %   Hemoglobin and Hematocrit    Collection Time: 07/18/22 10:59 AM   Result Value Ref Range    Hemoglobin 7.7 (L) 13.5 - 17.5 g/dL    Hematocrit 23.1 (L) 41 - 53 %       Imaging/Diagnostics:  XR CHEST PORTABLE    Result Date: 7/17/2022  No acute cardiopulmonary process Presumed colonic interposition beneath the right hemidiaphragm identified prior chest x-ray. ., Similar finding if there is concern for free air, consider lateral decubitus x-rays of the abdomen. Assessment :      Hospital Problems             Last Modified POA    * (Principal) GIB (gastrointestinal bleeding) 7/17/2022 Yes    Depression 7/18/2022 Yes    S/p TAVR (transcatheter aortic valve replacement), bioprosthetic 7/18/2022 Yes    Hyperlipidemia 7/18/2022 Yes    Long term (current) use of anticoagulants 7/18/2022 Yes    Atrial fibrillation ([address] 7/18/2022 Yes       Plan:     Patient status inpatient in the Progressive Unit/Step down    Acute blood loss anemia  -Likely secondary to upper GI bleed   -BID pantoprazole   -HgB q6h   -Maintenance fluids at 75 mL/hr   -Gastroenterology following; plan for EGD today     Atrial fibrillation   -Patient is currently in sinus rhythm   -Holding home Eliquis due to active bleeding   -Holding home metoprolol due to borderline hypotension     Hx of TAVR   -Holding anticoagulation as above     Depression  -Continue home escitalopram     Hyperlipidemia   -Continue home atorvastatin 40 mg daily     Consultations:   IP CONSULT TO GI  IP CONSULT TO SOCIAL WORK    Patient is admitted as inpatient status because of co-morbidities listed above, severity of signs and symptoms as outlined, requirement for current medical therapies and most importantly because of direct risk to patient if care not provided in a hospital setting. Expected length of stay > 48 hours.     Ti Boyer MD  7/18/2022  12:52 PM    Copy sent to Dr. Pati Juares MD

## 2022-07-18 NOTE — PROGRESS NOTES
3rd unit of blood initated at 0607 am. IV infiltrated at 0615. RN attempted multiple times to get access, unsuccessfully. Per lab blood is last unit in house, needs to be completed by 0936. Writer called ER to attempt getting access. Access gained.  Blood reinitiated at 0710

## 2022-07-18 NOTE — ED NOTES
Rectal exam done by dr Narciso Hardy, pt tolerated well, + hemoccult      Marven Dakin, RN  07/17/22 3064

## 2022-07-18 NOTE — ANESTHESIA POSTPROCEDURE EVALUATION
POST- ANESTHESIA EVALUATION       Pt Name: Hussein Vann  MRN: [id number]  YOB: 1935  Date of evaluation: 7/18/2022  Time:  1:54 PM      BP (!) 111/49   Pulse 62   Temp 98 °F (36.7 °C) (Temporal)   Resp 26   Ht 5' 10\" (1.778 m)   Wt 203 lb 7.8 oz (92.3 kg)   SpO2 100%   BMI 29.20 kg/m²      Consciousness Level  Awake  Cardiopulmonary Status  Stable  Pain Adequately Treated YES  Nausea / Vomiting  NO  Adequate Hydration  YES  Anesthesia Related Complications NONE      Electronically signed by Nabila Edmonds MD on 7/18/2022 at 1:54 PM       Department of Anesthesiology  Postprocedure Note    Patient: Hussein Vann  MRN: [id number]  YOB: 1935  Date of evaluation: 7/18/2022      Procedure Summary     Date: 07/18/22 Room / Location: [facility] PROCEDURE ROOM / [address] [place] [address]    Anesthesia Start: 1628 Anesthesia Stop: 8046    Procedure: EGD CONTROL HEMORRHAGE (Esophagus) Diagnosis:       Gastrointestinal hemorrhage, unspecified gastrointestinal hemorrhage type      Blood loss anemia      (Gastrointestinal hemorrhage, unspecified gastrointestinal hemorrhage type [K92.2]; Blood loss anemia [D50.0])    Surgeons: Alexander Fernandez MD Responsible Provider: Nabila Edmonds MD    Anesthesia Type: MAC ASA Status: 4 - Emergent          Anesthesia Type: No value filed.     Drea Phase I: Drea Score: 9    Drea Phase II:        Anesthesia Post Evaluation

## 2022-07-18 NOTE — ED NOTES
Pt resting comfortable, no complaints, tolerating blood transfusion well, daughter remains at bedside      Richard Tomas, RN  07/17/22 9953

## 2022-07-18 NOTE — CONSULTS
GASTROENTEROLOGY CONSULTATION NOTE    7/18/2022      REASON FOR CONSULT: GI bleed    REQUESTING PHYSICIAN:  Waleska Chou MD    HISTORY OF PRESENT ILLNESS:    The patient is a 81 y.o. male who presents with poor oral intake and profuse diarrhea. The family noted that the patient was very pale and weak and decided to bring the patient in to the ED. 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 The patient had a TAVR with a bioprosthetic valve and has a history of A. fib and takes Eliquis daily. The patient does not take any PPI at home. Per the daughter the patient was taking 2 of Aleve twice daily periodically for the last several days. The patient denies any bloody or black stools however he was noted to have dark maroon stools which were guaiac positive in the ED. His hemoglobin was noted to be 4 g/dL. Patient received 3 units of PRBC since admission. He is currently hemodynamically stable. No history of stomach ulcers. The patient does not take any PPI at home. Medical History:   Past Medical History:   Diagnosis Date    Heart attack ([facility] [address] 2003    High cholesterol     Hypertension     Stroke [facility]) 2019       SURGICAL HISTORY:  Past Surgical History:   Procedure Laterality Date    ARTERIAL BYPASS SURGRY  2004    CARDIAC CATHETERIZATION  2003    CARDIAC VALVE REPLACEMENT  09/2019    MOUTH BIOPSY  09/2019    tonsil        MEDS:  Prior to Admission medications    Medication Sig Start Date End Date Taking? Authorizing Provider   apixaban (ELIQUIS) 5 MG TABS tablet Take 5 mg by mouth in the morning and 5 mg before bedtime.     Historical Provider, MD   atorvastatin (LIPITOR) 40 MG tablet Take 40 mg by mouth daily 11/24/19   Historical Provider, MD   escitalopram (LEXAPRO) 10 MG tablet Take 10 mg by Problem Relation Age of Onset    Colon Cancer Mother     Other Father        REVIEW OF SYSTEMS:  10 out of 14 systems were reviewed and otherwise negative per patient recall. PHYSICAL EXAM:    BP (!) 109/55   Pulse 69   Temp 98.2 °F (36.8 °C) (Oral)   Resp 18   Ht 5' 10\" (1.778 m)   Wt 203 lb 7.8 oz (92.3 kg)   SpO2 96%   BMI 29.20 kg/m²     General:  Alert and oriented x 3. No acute distress, pleasant conscious uncooperative. Nontoxic in appearance. HEENT:  Sclera are anicteric and conjunctiva are normal.  Normocephalic, mild bitemporal wasting. Hearing is adequate. Moist oropharynx without thrush. No oral ulcers are seen. Neck is supple without masses. No palpable cervical or supraclavicular lymph nodes  Lungs:  Clear to auscultation, with no rales, wheezes, or rhonchi. Unlabored breathing pattern with adequate aeration. Heart:  Regular rate and rhythm. There are no murmurs. Abdomen: Bowel sounds are normal.  The abdomen is soft and non distended. There is no tenderness, guarding, rebound, or rigidity. There are no palpable masses, hepatosplenomegaly. Extemities: There is no clubbing or edema. Pulses are palpable. Skin:  No rashes or lesions appreciated. There is no jaundice. Neuro:  CN 2-12 intact bilaterally, no asterixis, no obvious focal neurologic deficits.   Musculoskeletal:  Normal muscle tone, no joint swelling,     Lab and Imaging Review   Recent Labs     07/17/22 2026 07/17/22 2049 07/18/22  0446   WBC 8.4  --   --    HGB 4.9*  --  6.1*   MCV 94.1  --   --    *  --   --    INR  --  1.3 1.3     --  138   K 5.1  --  4.8     --  108*   CO2 21  --  22   BUN 74*  --  71*   CREATININE 1.69*  --  1.58*   GLUCOSE 153*  --  131*   CALCIUM 9.4  --  8.9   PROT 5.1*  --  4.5*   LABALBU 3.9  --  3.3*   AST 33  --  26   ALT 22  --  19   ALKPHOS 76  --  63   BILITOT 0.31  --  0.42   BILIDIR 0.08  --   --    LIPASE 61*  --   --    MG 1.8  --   --      Recent Labs 07/17/22 2049 07/18/22  0446   INR 1.3 1.3   PROTIME 13.5* 13.3*       IMPRESSION:  Melena likely due to peptic ulcer  Acute blood loss anemia likely due to GI bleed  Acute peptic ulcer with bleeding likely due to chronic NSAID use on top of therapeutic anticoagulation  History of TAVR  Chronic A. fib      RECOMMENDATIONS:   Keep patient n.p.o. We will plan for EGD today  Continue IV pantoprazole      Please note that this chart was generated using voice recognition Dragon dictation software. Although every effort was made to ensure the accuracy of this automated transcription, some errors in transcription may have occurred.       Electronically signed by Teodoro Lal MD on 7/18/2022 at 9:07 AM

## 2022-07-18 NOTE — ED PROVIDER NOTES
ADDENDUM:      Care of this patient was assumed from Dr. Annemarie Salinas. The patient was seen for Fatigue (Decreased appetite starting Tuesday ) and Diarrhea  . The patient's initial evaluation and plan have been discussed with the prior provider who initially evaluated the patient. Nursing Notes, Past Medical Hx, Past Surgical Hx, Social Hx, Allergies, and Family Hx were all reviewed. Diagnostic Results       RADIOLOGY:   Non-plain film images such as CT, Ultrasound and MRI are read by the radiologist. Plain radiographic images are visualized and the radiologist interpretations are reviewed as follows:     XR CHEST PORTABLE    Result Date: 7/17/2022  EXAMINATION: ONE XRAY VIEW OF THE CHEST 7/17/2022 10:23 pm COMPARISON: 03/18/2020 HISTORY: ORDERING SYSTEM PROVIDED HISTORY: GI Bleed TECHNOLOGIST PROVIDED HISTORY: GI Bleed Reason for Exam:  GI bleed FINDINGS: The cardiomediastinal silhouette is mildly enlarged. There are sternotomy wires. .  The lungs are clear. No pleural effusion or pneumothorax is present. Presumed colonic interposition on the right. No acute cardiopulmonary process Presumed colonic interposition beneath the right hemidiaphragm identified prior chest x-ray. ., Similar finding if there is concern for free air, consider lateral decubitus x-rays of the abdomen.       LABS:   Results for orders placed or performed during the hospital encounter of 07/17/22   CBC with Auto Differential   Result Value Ref Range    WBC 8.4 3.5 - 11.0 k/uL    RBC 1.59 (L) 4.5 - 5.9 m/uL    Hemoglobin 4.9 (LL) 13.5 - 17.5 g/dL    Hematocrit 14.9 (L) 41 - 53 %    MCV 94.1 80 - 100 fL    MCH 30.9 26 - 34 pg    MCHC 32.8 31 - 37 g/dL    RDW 15.6 (H) 12.5 - 15.4 %    Platelets 241 (L) 873 - 450 k/uL    MPV 7.4 6.0 - 12.0 fL    Seg Neutrophils 83 (H) 36 - 66 %    Lymphocytes 12 (L) 24 - 44 %    Monocytes 5 1 - 7 %    Eosinophils % 0 (L) 1 - 4 %    Basophils 0 0 - 2 %    Segs Absolute 6.97 1.8 - 7.7 k/uL    Absolute Lymph # 1.01 1.0 - 4.8 k/uL    Absolute Mono # 0.42 0.1 - 0.8 k/uL    Absolute Eos # 0.00 0.0 - 0.4 k/uL    Basophils Absolute 0.00 0.0 - 0.2 k/uL    Morphology ANISOCYTOSIS PRESENT     Morphology 1+ POLYCHROMASIA    BMP   Result Value Ref Range    Glucose 153 (H) 70 - 99 mg/dL    BUN 74 (H) 8 - 23 mg/dL    CREATININE 1.69 (H) 0.70 - 1.20 mg/dL    Calcium 9.4 8.6 - 10.4 mg/dL    Sodium 135 135 - 144 mmol/L    Potassium 5.1 3.7 - 5.3 mmol/L    Chloride 102 98 - 107 mmol/L    CO2 21 20 - 31 mmol/L    Anion Gap 12 9 - 17 mmol/L    GFR Non-African American 39 (L) >60 mL/min    GFR  47 (L) >60 mL/min    GFR Comment         Hepatic Function Panel   Result Value Ref Range    Albumin 3.9 3.5 - 5.2 g/dL    Alkaline Phosphatase 76 40 - 129 U/L    ALT 22 5 - 41 U/L    AST 33 <40 U/L    Total Bilirubin 0.31 0.3 - 1.2 mg/dL    Bilirubin, Direct 0.08 <0.31 mg/dL    Bilirubin, Indirect 0.23 0.00 - 1.00 mg/dL    Total Protein 5.1 (L) 6.4 - 8.3 g/dL    Albumin/Globulin Ratio 3.3 (H) 1.0 - 2.5   Lipase   Result Value Ref Range    Lipase 61 (H) 13 - 60 U/L   Magnesium   Result Value Ref Range    Magnesium 1.8 1.6 - 2.6 mg/dL   Troponin   Result Value Ref Range    Troponin, High Sensitivity 66 (HH) 0 - 22 ng/L   Brain Natriuretic Peptide   Result Value Ref Range    Pro-BNP 2,810 (H) <300 pg/mL   Troponin   Result Value Ref Range    Troponin, High Sensitivity 59 (HH) 0 - 22 ng/L   Protime-INR   Result Value Ref Range    Protime 13.5 (H) 9.4 - 12.6 sec    INR 1.3    APTT   Result Value Ref Range    PTT 20.7 (L) 21.3 - 31.3 sec   TYPE AND SCREEN   Result Value Ref Range    Expiration Date 07/20/2022,2359     Arm Band Number [id number]     ABO/Rh O POSITIVE     Antibody Screen NEGATIVE     Unit Number [id number]     Product Code Leukocyte Reduced Red Cell     Unit Divison 00     Dispense Status ISSUED     Unit Issue Date/Time [id number]     Product Code Blood Bank [id number]     Blood Bank Unit Type and Rh O POS     Blood Bank ISBT Product Blood Type 5100     Blood Bank Blood Product Expiration Date 615904876991     Transfusion Status OK TO TRANSFUSE     Crossmatch Result COMPATIBLE     Unit Number [id number]     Product Code Leukocyte Reduced Red Cell     Unit Divison 00     Dispense Status ALLOCATED     Transfusion Status OK TO TRANSFUSE     Crossmatch Result COMPATIBLE        RECENT VITALS:  BP: (!) 112/42, Temp: 98.8 °F (37.1 °C), Heart Rate: 75, Resp: 16     ED Course     The patient was given the following medications:  Orders Placed This Encounter   Medications    0.9 % sodium chloride bolus    0.9 % sodium chloride infusion    apixaban (ELIQUIS) tablet 5 mg     Order Specific Question:   Indication of Use     Answer:   A Fib/A Flutter     Order Specific Question:   Indication of Use     Answer: Other     Order Specific Question:   Other Apixaban Indication     Answer:   mechanical valve    atorvastatin (LIPITOR) tablet 40 mg    escitalopram (LEXAPRO) tablet 10 mg    metoprolol tartrate (LOPRESSOR) tablet 25 mg    0.9 % sodium chloride infusion    sodium chloride flush 0.9 % injection 5-40 mL    sodium chloride flush 0.9 % injection 5-40 mL    0.9 % sodium chloride infusion    OR Linked Order Group     ondansetron (ZOFRAN-ODT) disintegrating tablet 4 mg     ondansetron (ZOFRAN) injection 4 mg    OR Linked Order Group     acetaminophen (TYLENOL) tablet 650 mg     acetaminophen (TYLENOL) suppository 650 mg    pantoprazole (PROTONIX) 40 mg in sodium chloride (PF) 10 mL injection     During the emergency department course, patient was given normal saline bolus followed by infusion. Patient also is receiving first unit of packed RBCs during the ED stay. Patient remained hemodynamically stable and neurologically intact throughout the ED course.     Medical Decision Making      31-year-old male patient presents to the emergency department accompanied by his daughter for evaluation of decreased appetite for last 3 days, diarrhea since 2 PM about 4-5 episodes and generalized weakness. He appeared pale to the family. His rectal examination performed by Dr. Richard Banks revealed dark red-colored stools which is Hemoccult positive. Patient denies any abdominal pain, chest pain or shortness of breath. He is on Eliquis for history of aortic valve replacement and atrial fibrillation. He also occasionally takes Aleve. Patient will benefit from hospitalization. I discussed the case with Claudine Toussaint CNP covering for hospitalist group and she kindly accepted the patient to be admitted on stepdown unit. Admission condition is stable. Patient's CODE STATUS is DNRCC-A    Disposition     FINAL IMPRESSION      1. Gastrointestinal hemorrhage, unspecified gastrointestinal hemorrhage type    2. Blood loss anemia          DISPOSITION/PLAN   DISPOSITION Admitted 07/17/2022 11:30:30 PM      PATIENT REFERRED TO:  No follow-up provider specified. DISCHARGE MEDICATIONS:  Current Discharge Medication List                (Please note that portions of this note were completed with a voice recognition program.  Efforts were made to edit the dictations but occasionally words are mis-transcribed.)    Davie Harris MD,, MD, F.A.C.E.P.   Attending Emergency Medicine Physician                Davie Harris MD  07/17/22 2048

## 2022-07-18 NOTE — OP NOTE
Operative Note      Patient: Liya Lackey  YOB: 1935  MRN: [id number]    Date of Procedure: 7/18/2022    Pre-Op Diagnosis: Gastrointestinal hemorrhage, unspecified gastrointestinal hemorrhage type [K92.2]; Blood loss anemia [D50.0]    Post-Op Diagnosis: Same         Procedure(s):  EGD CONTROL HEMORRHAGE    Surgeon(s):  Marga Ortega MD    Assistant:   * No surgical staff found *    Anesthesia: Monitor Anesthesia Care    Estimated Blood Loss (mL): Minimal    Complications: None    Specimens:   * No specimens in log *    Implants:  * No implants in log *      Drains: * No LDAs found *    Findings:   A nonobstructive Schatzki ring was found at the GE junction. Mild antral gastritis and acid hematin was found in the stomach. A 20 mm ulcer with a visible vessel was found in the duodenal bulb. This was injected with 3 mL of 1: 10,000 epinephrine mixed saline and treated with bipolar cautery. There was no bleeding during or at the end of the procedure. 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 These were successfully treated with bipolar cautery. Recommendations:  Continue IV pantoprazole 40 mg twice daily for 3 more days. Start Carafate 1 g every 6 hours for 4 weeks. Hold anticoagulation. May resume after 5 days. No NSAIDs for life. Start clear liquid diet and advance as tolerated. Monitor H&H and transfuse as clinically indicated. Detailed Description of Procedure:   Informed consent was obtained from the patient's daughter after explanation of the procedure including indications, description of the procedure,  benefits and possible risks and complications of the procedure, and alternatives. Questions were answered. The patient's history was reviewed and a directed physical examination was performed prior to the procedure. Patient was monitored throughout the procedure with pulse oximetry and periodic assessment of vital signs.  Patient was sedated as noted above. With the patient in the left lateral decubitus position, the Olympus videoendoscope was placed in the patient's mouth and under direct visualization passed into the esophagus. Visualization of the esophagus, stomach, and duodenum was performed during both introduction and withdrawal of the endoscope and retroflexed view of the proximal stomach was obtained. The scope was passed to the 2nd portion of the duodenum. The patient tolerated the procedure well and was taken to the recovery area in good condition. The patient  was taken to the recovery area in good condition.      Electronically signed by Ana Tam MD on 7/18/2022 at 1:13 PM

## 2022-07-19 PROBLEM — I48.0 PAF (PAROXYSMAL ATRIAL FIBRILLATION) (HCC): Status: ACTIVE | Noted: 2019-09-13

## 2022-07-19 PROBLEM — D50.0 BLOOD LOSS ANEMIA: Status: ACTIVE | Noted: 2019-06-20

## 2022-07-19 LAB
ABO/RH: NORMAL
ANTIBODY SCREEN: NEGATIVE
ARM BAND NUMBER: NORMAL
BLD PROD TYP BPU: NORMAL
BLOOD BANK BLOOD PRODUCT EXPIRATION DATE: NORMAL
BLOOD BANK ISBT PRODUCT BLOOD TYPE: 5100
BLOOD BANK PRODUCT CODE: NORMAL
BLOOD BANK UNIT TYPE AND RH: NORMAL
BPU ID: NORMAL
CROSSMATCH RESULT: NORMAL
DISPENSE STATUS BLOOD BANK: NORMAL
EXPIRATION DATE: NORMAL
HCT VFR BLD CALC: 20.4 % (ref 41–53)
HCT VFR BLD CALC: 21 % (ref 41–53)
HCT VFR BLD CALC: 21.3 % (ref 41–53)
HCT VFR BLD CALC: 21.8 % (ref 41–53)
HEMOGLOBIN: 6.7 G/DL (ref 13.5–17.5)
HEMOGLOBIN: 6.8 G/DL (ref 13.5–17.5)
HEMOGLOBIN: 7 G/DL (ref 13.5–17.5)
HEMOGLOBIN: 7.2 G/DL (ref 13.5–17.5)
TRANSFUSION STATUS: NORMAL
UNIT DIVISION: 0
UNIT ISSUE DATE/TIME: NORMAL

## 2022-07-19 PROCEDURE — 2060000000 HC ICU INTERMEDIATE R&B

## 2022-07-19 PROCEDURE — 85018 HEMOGLOBIN: CPT

## 2022-07-19 PROCEDURE — 6370000000 HC RX 637 (ALT 250 FOR IP): Performed by: INTERNAL MEDICINE

## 2022-07-19 PROCEDURE — 6360000002 HC RX W HCPCS: Performed by: STUDENT IN AN ORGANIZED HEALTH CARE EDUCATION/TRAINING PROGRAM

## 2022-07-19 PROCEDURE — 99232 SBSQ HOSP IP/OBS MODERATE 35: CPT | Performed by: INTERNAL MEDICINE

## 2022-07-19 PROCEDURE — 2580000003 HC RX 258: Performed by: STUDENT IN AN ORGANIZED HEALTH CARE EDUCATION/TRAINING PROGRAM

## 2022-07-19 PROCEDURE — 85014 HEMATOCRIT: CPT

## 2022-07-19 PROCEDURE — 6370000000 HC RX 637 (ALT 250 FOR IP): Performed by: STUDENT IN AN ORGANIZED HEALTH CARE EDUCATION/TRAINING PROGRAM

## 2022-07-19 PROCEDURE — 99232 SBSQ HOSP IP/OBS MODERATE 35: CPT | Performed by: HOSPITALIST

## 2022-07-19 PROCEDURE — C9113 INJ PANTOPRAZOLE SODIUM, VIA: HCPCS | Performed by: STUDENT IN AN ORGANIZED HEALTH CARE EDUCATION/TRAINING PROGRAM

## 2022-07-19 PROCEDURE — 2580000003 HC RX 258: Performed by: CLINICAL NURSE SPECIALIST

## 2022-07-19 PROCEDURE — A4216 STERILE WATER/SALINE, 10 ML: HCPCS | Performed by: STUDENT IN AN ORGANIZED HEALTH CARE EDUCATION/TRAINING PROGRAM

## 2022-07-19 PROCEDURE — 6370000000 HC RX 637 (ALT 250 FOR IP): Performed by: CLINICAL NURSE SPECIALIST

## 2022-07-19 PROCEDURE — 36415 COLL VENOUS BLD VENIPUNCTURE: CPT

## 2022-07-19 RX ADMIN — SODIUM CHLORIDE, PRESERVATIVE FREE 10 ML: 5 INJECTION INTRAVENOUS at 10:44

## 2022-07-19 RX ADMIN — SODIUM CHLORIDE, PRESERVATIVE FREE 40 MG: 5 INJECTION INTRAVENOUS at 10:44

## 2022-07-19 RX ADMIN — ESCITALOPRAM OXALATE 10 MG: 10 TABLET ORAL at 10:44

## 2022-07-19 RX ADMIN — ARIPIPRAZOLE 2 MG: 2 TABLET ORAL at 10:44

## 2022-07-19 RX ADMIN — SODIUM CHLORIDE, PRESERVATIVE FREE 40 MG: 5 INJECTION INTRAVENOUS at 22:01

## 2022-07-19 RX ADMIN — SODIUM CHLORIDE: 9 INJECTION, SOLUTION INTRAVENOUS at 06:21

## 2022-07-19 RX ADMIN — SUCRALFATE 1 G: 1 TABLET ORAL at 06:19

## 2022-07-19 RX ADMIN — ATORVASTATIN CALCIUM 40 MG: 40 TABLET, FILM COATED ORAL at 10:44

## 2022-07-19 RX ADMIN — SUCRALFATE 1 G: 1 TABLET ORAL at 18:18

## 2022-07-19 RX ADMIN — SUCRALFATE 1 G: 1 TABLET ORAL at 10:44

## 2022-07-19 NOTE — PROGRESS NOTES
[facility]  Office: [address], DO, Todd Simper, DO, Carlito Velez, DO, Harman Rodriguezn Blood, DO, Constance Bone MD, Karl Salazar MD, Navi Sanon MD, Lauren Benitez MD,  Roscoe Carranza MD, Alyse Arndt MD, Andie Lewis, DO, Sendy Weinberg MD,  Jossue Fatima MD, Lisa Cano MD, Aicha Tariq, DO, Emmy Escalera MD, Esha Bai MD, Cindi Carroll MD, Raffaele Jc DO, Jim Logan MD, Anushka Graves MD, Birtha Members, CNP,  Easton Langford, CNP, Tunde Mera, CNP, Godfrey Rosado, CNP, Pacheco Spivey PARanulfoC, Griffin Roberson, [facility], Alicia Montemayor, CNP, Chelsea Singletary, CNP, Kym Wang, [facility], [facility], CNP, Evette Jacques, CNS, Buddy Flowers, [facility], Sushant Silva, CNP, Leslie Ivy, CNP, Maggie Arango, CNP, Trinity Ariza, [facility]           [address] [facility]    Progress Note    7/19/2022    12:40 PM    Name:   Gisel Marin  MRN:     [id number]     Acct:      [de-identified]   Room:   [address] [place] [address] Day:  2  Admit Date:  7/17/2022  8:11 PM    PCP:   Giorgi Meek MD  Code Status:  DNR-CCA    Subjective:     C/C:   Chief Complaint   Patient presents with    Fatigue     Decreased appetite starting Tuesday     Diarrhea     Patient seen in follow-up for fatigue, diarrhea secondary to upper GI bleed. Patient states \"I am tired\"    Interval History Status: improved. Chart reviewed, patient does have a history of stroke and reported aphasia however he does seem to be able to answer simple questions for me. His hemoglobin is presently 7. It is reasonable to hold off on any further transfusions at this point in time. He does state that he is agreeable to further transfusions if necessary. Patient is to be maintained on IV Protonix for 72 hours post EGD. GI input noted and appreciated. Patient denies any questions or concerns. His only complaint is that of generalized fatigue.     Brief History: This 79-year-old male presented to the hospital with fatigue and diarrhea. He has been found to have an acute upper GI bleed secondary to duodenal ulcer that been complicated by anticoagulation. He is status post packed red blood cells and cautery of AVMs. Hemoglobin presently stable. Review of Systems:     Constitutional:  positive for fatigue. Respiratory:  negative for cough, dyspnea on exertion, shortness of breath, wheezing  Cardiovascular:  negative for chest pain, chest pressure/discomfort, lower extremity edema, palpitations  Gastrointestinal:  negative for abdominal pain, constipation, diarrhea, nausea, vomiting  Neurological:  negative for dizziness, headache    Medications: Allergies:  No Known Allergies    Current Meds:   Scheduled Meds:    pantoprazole (PROTONIX) 40 mg injection  40 mg IntraVENous Q12H    sucralfate  1 g Oral 4 times per day    ARIPiprazole  2 mg Oral Daily    [Held by provider] apixaban  5 mg Oral BID    atorvastatin  40 mg Oral Daily    escitalopram  10 mg Oral Daily    [Held by provider] metoprolol tartrate  25 mg Oral BID    sodium chloride flush  5-40 mL IntraVENous 2 times per day     Continuous Infusions:    sodium chloride      sodium chloride 75 mL/hr at 07/19/22 1740    sodium chloride       PRN Meds: sodium chloride, sodium chloride flush, sodium chloride, ondansetron **OR** ondansetron, acetaminophen **OR** acetaminophen    Data:     Past Medical History:   has a past medical history of Heart attack ([facility] [address] High cholesterol, Hypertension, and Stroke ([facility] [address] Social History:   reports that he has never smoked. He has never used smokeless tobacco. He reports that he does not currently use alcohol. He reports that he does not use drugs.      Family History:   Family History   Problem Relation Age of Onset    Colon Cancer Mother     Other Father        Vitals:  BP (!) 117/47   Pulse 61   Temp 97.1 °F (36.2 °C) (Infrared)   Resp 18   Ht 5' 10\" (1.778 m)   Wt 199 lb 1.2 oz (90.3 kg)   SpO2 100%   BMI 28.56 kg/m²   Temp (24hrs), Av.8 °F (36.6 °C), Min:97.1 °F (36.2 °C), Max:98.6 °F (37 °C)    No results for input(s): POCGLU in the last 72 hours. I/O (24Hr): Intake/Output Summary (Last 24 hours) at 2022 1240  Last data filed at 2022 6722  Gross per 24 hour   Intake 1704.93 ml   Output --   Net 1704.93 ml       Labs:  Hematology:  Recent Labs     22  0446 22  1059 22  2233 22  0507 22  1114   WBC 8.4  --   --   --   --   --   --    RBC 1.59*  --   --   --   --   --   --    HGB 4.9*  --  6.1*   < > 7.3* 6.7* 7.0*   HCT 14.9*  --  18.4*   < > 21.7* 20.4* 21.3*   MCV 94.1  --   --   --   --   --   --    MCH 30.9  --   --   --   --   --   --    MCHC 32.8  --   --   --   --   --   --    RDW 15.6*  --   --   --   --   --   --    *  --   --   --   --   --   --    MPV 7.4  --   --   --   --   --   --    INR  --  1.3 1.3  --   --   --   --     < > = values in this interval not displayed. Chemistry:  Recent Labs     22     --  138   K 5.1  --  4.8     --  108*   CO2    GLUCOSE 153*  --  131*   BUN 74*  --  71*   CREATININE 1.69*  --  1.58*   MG 1.8  --   --    ANIONGAP 12  --  8*   LABGLOM 39*  --  42*   GFRAA 47*  --  51*   CALCIUM 9.4  --  8.9   PROBNP 2,810*  --   --    TROPHS 66* 59*  --      Recent Labs     22  0446   PROT 5.1* 4.5*   LABALBU 3.9 3.3*   AST 33 26   ALT 22 19   ALKPHOS 76 63   BILITOT 0.31 0.42   BILIDIR 0.08  --    LIPASE 61*  --      ABG:  Lab Results   Component Value Date/Time    FIO2 NOT REPORTED 2020 01:15 PM     No results found for: SPECIAL  No results found for: CULTURE    Radiology:  XR CHEST PORTABLE    Result Date: 2022  No acute cardiopulmonary process Presumed colonic interposition beneath the right hemidiaphragm identified prior chest x-ray. ., Similar finding if there is concern for free air, consider lateral decubitus x-rays of the abdomen.        Physical Examination:       General appearance:  alert, cooperative and no distress  Mental Status:  oriented to person, place and time and normal affect  Lungs:  clear to auscultation bilaterally, normal effort  Heart:  regular rate and rhythm, no murmur  Abdomen:  soft, nontender, nondistended, normal bowel sounds, no masses, hepatomegaly, splenomegaly  Extremities:  no edema, redness, tenderness in the calves  Skin:  no gross lesions, rashes, induration    Assessment:     Hospital Problems             Last Modified POA    * (Principal) GIB (gastrointestinal bleeding) 7/17/2022 Yes    Depression 7/18/2022 Yes    S/p TAVR (transcatheter aortic valve replacement), bioprosthetic 7/18/2022 Yes    Hyperlipidemia 7/18/2022 Yes    Long term (current) use of anticoagulants 7/18/2022 Yes    Atrial fibrillation ([address] 7/18/2022 Yes       Plan:     GI bleed/duodenal ulcer/AVM  Status post EGD and cautery  Continue Protonix  Advance diet as tolerated  Anemia of acute upper GI blood loss  Status posttransfusion  Continue to monitor and transfuse as necessary  Atrial fibrillation  Paroxysmal  Resume Eliquis in 7 days  Aortic valve disease  Status post TAVR    Prince Trenton DO  7/19/2022  12:40 PM

## 2022-07-19 NOTE — PROGRESS NOTES
94.1  --   --   --   --   --    RDW 15.6*  --   --   --   --   --    *  --   --   --   --   --        ANEMIA STUDIES:  Recent Labs     07/17/22 2026   LABIRON 6*   TIBC 340   FERRITIN 13*       BMP:  Recent Labs     07/17/22 2026 07/18/22 0446    138   K 5.1 4.8    108*   CO2 21 22   BUN 74* 71*   CREATININE 1.69* 1.58*   GLUCOSE 153* 131*   CALCIUM 9.4 8.9       LFTS:  Recent Labs     07/17/22 2026 07/18/22 0446   ALKPHOS 76 63   ALT 22 19   AST 33 26   BILITOT 0.31 0.42   BILIDIR 0.08  --    LABALBU 3.9 3.3*       Amylase/Lipase and Ammonia:  Recent Labs     07/17/22 2026   LIPASE 61*       Acute Hepatitis Panel:  No results found for: HEPBSAG, HEPCAB, HEPBIGM, HEPAIGM    HCV Genotype:  No results found for: HEPATITISCGENOTYPE    HCV Quantitative:  No results found for: HCVQNT    LIVER WORK UP:    AFP  No results found for: AFP    Alpha 1 antitrypsin   No results found for: A1A    Anti - Liver/Kidney Ab  No results found for: LIVER-KIDNEYMICROSOMALAB    TAHIRA  No results found for: TAHIRA    AMA  No results found for: MITOAB    ASMA  No results found for: SMOOTHMUSCAB    Ceruloplasmin  No results found for: CERULOPLSM    Celiac panel  No results found for: [employer], IGA    PT/INR  Recent Labs     07/17/22 2049 07/18/22 0446   PROTIME 13.5* 13.3*   INR 1.3 1.3       Cancer Markers:  CEA:  No results for input(s): CEA in the last 72 hours. Ca 125:  No results for input(s):  in the last 72 hours. Ca 19-9:   Invalid input(s):   AFP: No results for input(s): AFP in the last 72 hours. Lactic acid:Invalid input(s): LACTIC ACID    Radiology Review:    No results found.         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 Continue Carafate 1 g every 6 hours for 4 weeks. OK to resume Plavix today  Resume Eliquis after 7 days. No NSAIDs for life. Advance to full liquid diet today and advance as tolerated. Monitor H&H and transfuse as clinically indicated.         Electronically signed by Adrian Hanson MD on 7/19/2022 at 8:14 AM

## 2022-07-20 LAB
ANION GAP SERPL CALCULATED.3IONS-SCNC: 7 MMOL/L (ref 9–17)
BUN BLDV-MCNC: 34 MG/DL (ref 8–23)
CALCIUM SERPL-MCNC: 8.1 MG/DL (ref 8.6–10.4)
CHLORIDE BLD-SCNC: 112 MMOL/L (ref 98–107)
CO2: 23 MMOL/L (ref 20–31)
CREAT SERPL-MCNC: 1.36 MG/DL (ref 0.7–1.2)
GFR AFRICAN AMERICAN: >60 ML/MIN
GFR NON-AFRICAN AMERICAN: 50 ML/MIN
GFR SERPL CREATININE-BSD FRML MDRD: ABNORMAL ML/MIN/{1.73_M2}
GLUCOSE BLD-MCNC: 90 MG/DL (ref 70–99)
HCT VFR BLD CALC: 21.6 % (ref 41–53)
HCT VFR BLD CALC: 22.6 % (ref 41–53)
HCT VFR BLD CALC: 23 % (ref 41–53)
HCT VFR BLD CALC: 25.4 % (ref 41–53)
HEMOGLOBIN: 7.1 G/DL (ref 13.5–17.5)
HEMOGLOBIN: 7.4 G/DL (ref 13.5–17.5)
HEMOGLOBIN: 7.4 G/DL (ref 13.5–17.5)
HEMOGLOBIN: 8.2 G/DL (ref 13.5–17.5)
POTASSIUM SERPL-SCNC: 3.8 MMOL/L (ref 3.7–5.3)
SODIUM BLD-SCNC: 142 MMOL/L (ref 135–144)

## 2022-07-20 PROCEDURE — 97116 GAIT TRAINING THERAPY: CPT

## 2022-07-20 PROCEDURE — 97166 OT EVAL MOD COMPLEX 45 MIN: CPT

## 2022-07-20 PROCEDURE — 99232 SBSQ HOSP IP/OBS MODERATE 35: CPT | Performed by: INTERNAL MEDICINE

## 2022-07-20 PROCEDURE — 80048 BASIC METABOLIC PNL TOTAL CA: CPT

## 2022-07-20 PROCEDURE — A4216 STERILE WATER/SALINE, 10 ML: HCPCS | Performed by: STUDENT IN AN ORGANIZED HEALTH CARE EDUCATION/TRAINING PROGRAM

## 2022-07-20 PROCEDURE — C9113 INJ PANTOPRAZOLE SODIUM, VIA: HCPCS | Performed by: STUDENT IN AN ORGANIZED HEALTH CARE EDUCATION/TRAINING PROGRAM

## 2022-07-20 PROCEDURE — 85014 HEMATOCRIT: CPT

## 2022-07-20 PROCEDURE — 97162 PT EVAL MOD COMPLEX 30 MIN: CPT

## 2022-07-20 PROCEDURE — 2060000000 HC ICU INTERMEDIATE R&B

## 2022-07-20 PROCEDURE — 6370000000 HC RX 637 (ALT 250 FOR IP): Performed by: INTERNAL MEDICINE

## 2022-07-20 PROCEDURE — 6360000002 HC RX W HCPCS: Performed by: STUDENT IN AN ORGANIZED HEALTH CARE EDUCATION/TRAINING PROGRAM

## 2022-07-20 PROCEDURE — 99232 SBSQ HOSP IP/OBS MODERATE 35: CPT | Performed by: HOSPITALIST

## 2022-07-20 PROCEDURE — 36415 COLL VENOUS BLD VENIPUNCTURE: CPT

## 2022-07-20 PROCEDURE — 2580000003 HC RX 258: Performed by: STUDENT IN AN ORGANIZED HEALTH CARE EDUCATION/TRAINING PROGRAM

## 2022-07-20 PROCEDURE — 85018 HEMOGLOBIN: CPT

## 2022-07-20 PROCEDURE — 97535 SELF CARE MNGMENT TRAINING: CPT

## 2022-07-20 PROCEDURE — 2580000003 HC RX 258: Performed by: CLINICAL NURSE SPECIALIST

## 2022-07-20 PROCEDURE — 6370000000 HC RX 637 (ALT 250 FOR IP): Performed by: STUDENT IN AN ORGANIZED HEALTH CARE EDUCATION/TRAINING PROGRAM

## 2022-07-20 PROCEDURE — 6370000000 HC RX 637 (ALT 250 FOR IP): Performed by: CLINICAL NURSE SPECIALIST

## 2022-07-20 RX ADMIN — SUCRALFATE 1 G: 1 TABLET ORAL at 06:25

## 2022-07-20 RX ADMIN — ESCITALOPRAM OXALATE 10 MG: 10 TABLET ORAL at 09:03

## 2022-07-20 RX ADMIN — ATORVASTATIN CALCIUM 40 MG: 40 TABLET, FILM COATED ORAL at 09:03

## 2022-07-20 RX ADMIN — SUCRALFATE 1 G: 1 TABLET ORAL at 17:09

## 2022-07-20 RX ADMIN — SODIUM CHLORIDE, PRESERVATIVE FREE 10 ML: 5 INJECTION INTRAVENOUS at 21:43

## 2022-07-20 RX ADMIN — SODIUM CHLORIDE, PRESERVATIVE FREE 40 MG: 5 INJECTION INTRAVENOUS at 21:39

## 2022-07-20 RX ADMIN — SODIUM CHLORIDE: 9 INJECTION, SOLUTION INTRAVENOUS at 17:07

## 2022-07-20 RX ADMIN — SUCRALFATE 1 G: 1 TABLET ORAL at 12:42

## 2022-07-20 RX ADMIN — SODIUM CHLORIDE, PRESERVATIVE FREE 40 MG: 5 INJECTION INTRAVENOUS at 09:03

## 2022-07-20 RX ADMIN — ARIPIPRAZOLE 2 MG: 2 TABLET ORAL at 09:03

## 2022-07-20 NOTE — PROGRESS NOTES
GI ATTENDING  Gastroenterology  Progress Note     . SUBJECTIVE:  No signs of recurrent bleeding  Hemoglobin stable at 7.4 g/dL  Patient denies abdominal pain  Hemodynamically stable  Remains on PPI drip        REASON FOR CONSULTATION: GI bleed      HISTORY OF PRESENT ILLNESS:         60-year-old male who initially presented on 07/18/2022, with poor oral intake, profuse diarrhea, weak and pale with reported episodes of diarrhea. History of A. fib and TAVR, on Eliquis, not on PPI at home. Patient presented with a hemoglobin of 4 g/dL. Received 3 units of PRBC transfusion. Underwent an upper endoscopy where the patient was identified to have a 2 cm ulcer in the duodenal bulb with visible vessel, underwent dual endoscopic therapy with epinephrine injection and bipolar cautery. 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 Patient remained on PPI therapy and was recommended to stay inpatient to complete 72 hours of PPI therapy post endoscopic therapy for high risk bleeding ulcer. PAST MEDICAL HISTORY:  Past Medical History:   Diagnosis Date    Heart attack ([facility] [address] 2003    High cholesterol     Hypertension     Stroke [facility]) 2019     Past Surgical History:   Procedure Laterality Date    ARTERIAL BYPASS SURGRY  2004    CARDIAC CATHETERIZATION  2003    CARDIAC VALVE REPLACEMENT  09/2019    ENDOSCOPY, COLON, DIAGNOSTIC      MOUTH BIOPSY  09/2019    tonsil     UPPER GASTROINTESTINAL ENDOSCOPY  07/18/2022    UPPER GASTROINTESTINAL ENDOSCOPY N/A 7/18/2022    EGD CONTROL HEMORRHAGE performed by Chalo Rodriguez MD at [facility]:  No Known Allergies    HOME MEDICATIONS:  Prior to Admission medications    Medication Sig Start Date End Date Taking? Authorizing Provider   apixaban (ELIQUIS) 5 MG TABS tablet Take 5 mg by mouth in the morning and 5 mg before bedtime.     Historical Provider, MD atorvastatin (LIPITOR) 40 MG tablet Take 40 mg by mouth daily 11/24/19   Historical Provider, MD   escitalopram (LEXAPRO) 10 MG tablet Take 10 mg by mouth daily    Historical Provider, MD   metoprolol tartrate (LOPRESSOR) 25 MG tablet Take 25 mg by mouth in the morning and 25 mg before bedtime. 10/2/19   Historical Provider, MD   niacin (NIASPAN) 1000 MG extended release tablet Take 1,000 mg by mouth daily 11/24/19   Historical Provider, MD   Multiple Vitamins-Minerals (CENTRUM ADULTS PO) Take 1 tablet by mouth daily    Historical Provider, MD     .  CURRENT MEDICATIONS:  Scheduled Meds:   pantoprazole (PROTONIX) 40 mg injection  40 mg IntraVENous Q12H    sucralfate  1 g Oral 4 times per day    ARIPiprazole  2 mg Oral Daily    [Held by provider] apixaban  5 mg Oral BID    atorvastatin  40 mg Oral Daily    escitalopram  10 mg Oral Daily    [Held by provider] metoprolol tartrate  25 mg Oral BID    sodium chloride flush  5-40 mL IntraVENous 2 times per day     . Continuous Infusions:   sodium chloride      sodium chloride 75 mL/hr at 07/20/22 1709    sodium chloride       . PRN Meds:sodium chloride, sodium chloride flush, sodium chloride, ondansetron **OR** ondansetron, acetaminophen **OR** acetaminophen  . SOCIAL HISTORY:     Social History     Socioeconomic History    Marital status:      Spouse name:  Suzi Barnett     Number of children: Not on file    Years of education: Not on file    Highest education level: Not on file   Occupational History    Not on file   Tobacco Use    Smoking status: Never    Smokeless tobacco: Never   Vaping Use    Vaping Use: Never used   Substance and Sexual Activity    Alcohol use: Not Currently    Drug use: Never    Sexual activity: Not Currently   Other Topics Concern    Not on file   Social History Narrative    Not on file     Social Determinants of Health     Financial Resource Strain: Not on file   Food Insecurity: Not on file   Transportation Needs: Not on file   Physical Activity: Not on file   Stress: Not on file   Social Connections: Not on file   Intimate Partner Violence: Not on file   Housing Stability: Not on file       FAMILY HISTORY:   Family History   Problem Relation Age of Onset    Colon Cancer Mother     Other Father        REVIEW OF SYSTEMS:     Constitutional: No fever, no chills, no lethargy, no weakness. HEENT:  No headache, otalgia, itchy eyes, nasal discharge or sore throat. Cardiac:  No chest pain, dyspnea, orthopnea or PND. Chest:   No cough, phlegm or wheezing. Abdomen:  No abdominal pain, nausea or vomiting. Neuro:  No focal weakness, abnormal movements or seizure like activity. Skin:   No rashes, no itching. :   No hematuria, no pyuria, no dysuria, no flank pain. Extremities:  No swelling or joint pains. ROS was otherwise negative except as mentioned in the [address] PHYSICAL EXAM:    BP (!) 141/71   Pulse 60   Temp 98.1 °F (36.7 °C)   Resp 16   Ht 5' 10\" (1.778 m)   Wt 189 lb 9.5 oz (86 kg)   SpO2 100%   BMI 27.20 kg/m²   . TMAX[24]    General: Well developed, Well nourished, No apparent distress  Head:  Normocephalic, Atraumatic  EENT: EOMI, Sclera not icteric, Oropharynx moist  Neck:  Supple, Trachea midline  Lungs:CTA Bilaterally  Heart: RRR, No murmur, No rub, No gallop, PMI nondisplaced. Abdomen:Soft, Non tender, Not distended, BS WNL,  No masses. Ext:No clubbing. No cyanosis. No edema. Skin: No rashes. No jaundice. No stigmata of liver disease.     Neuro:  A&O x Three, No focal neurological deficits    LABS and IMAGING:     Hemotological labs:   ANEMIA STUDIES  Recent Labs     07/17/22 2026   LABIRON 6*   TIBC 340   FERRITIN 13*       CBC  Recent Labs     07/17/22 2026 07/18/22  0446 07/19/22  1114 07/19/22  1713 07/19/22  2306 07/20/22  0454 07/20/22  1114   WBC 8.4  --   --   --   --   --   --    HGB 4.9*   < > 7.0* 6.8* 7.2* 7.1* 7.4*   MCV 94.1  --   --   --   --   --   --    RDW 15.6*  --   --   --   --   --   --    PLT 113*  --   --   --   --   --   --     < > = values in this interval not displayed. PT/INR  Recent Labs     07/17/22 2049 07/18/22 0446   PROTIME 13.5* 13.3*   INR 1.3 1.3       BMP  Recent Labs     07/17/22 2026 07/18/22 0446 07/20/22  0454    138 142   K 5.1 4.8 3.8    108* 112*   CO2 21 22 23   BUN 74* 71* 34*   CREATININE 1.69* 1.58* 1.36*   GLUCOSE 153* 131* 90   CALCIUM 9.4 8.9 8.1*       LIVER WORK UP  Hepatitis Functional Panel:  Recent Labs     07/17/22 2026 07/18/22 0446   ALKPHOS 76 63   ALT 22 19   AST 33 26   PROT 5.1* 4.5*   BILITOT 0.31 0.42   BILIDIR 0.08  --    LABALBU 3.9 3.3*       AMYLASE/LIPASE/AMMONIA  Recent Labs     07/17/22 2026   LIPASE 61*       Acute Hepatitis Panel   No results found for: HEPBSAG, HEPCAB, HEPBIGM, HEPAIGM    HCV Genotype   No results found for: HEPATITISCGENOTYPE    HCV Quantitative   No results found for: HCVQNT    Metabolic work up:  Ceruloplasmin  AA work up:  Alpha 1 antitrypsin   No results found for: A1A  AMA:  No results found for: MITOAB    ASMA:  No results found for: SMOOTHMUSCAB    ANCA:    TAHIRA:  No results found for: TAHIRA    Anti - Liver/Kidney Ab:  No results found for: LIVER-KIDNEYMICROSOMALAB    Ceruloplasmin:  No results found for: CERULOPLSM    Celiac panel:  No results found for: TISSTRNTIIGG, TTGIGA, IGA    Cancer Markers:  CEA:    No results for input(s): CEA in the last 72 hours. Ca 125:   No results for input(s):  in the last 72 hours. Ca 19-9:     Invalid input(s):   AFP: No results for input(s): AFP in the last 72 hours. ASSESSMENT and PLAN:     81-year-old male who initially presented on 07/18/2022, with poor oral intake, profuse diarrhea, weak and pale with reported episodes of diarrhea. History of A. fib and TAVR, on Eliquis, not on PPI at home. Patient presented with a hemoglobin of 4 g/dL. Received 3 units of PRBC transfusion.   Underwent an upper endoscopy where the patient was identified to have a 2 cm ulcer in the duodenal bulb with visible vessel, underwent dual endoscopic therapy with epinephrine injection and bipolar cautery. 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 Patient remained on PPI therapy and was recommended to stay inpatient to complete 72 hours of PPI therapy post endoscopic therapy for high risk bleeding ulcer. 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 Protonix of 40 mg twice daily. Continue hold anticoagulation until 5 days after the procedure and if no signs of recurrent bleeding  - Continue to monitor for signs of bleeding with H&H every 8 to every 12 hour. Continue disease appropriate regular diet. GI to follow for any signs of recurrent bleeding  - Patient will require PPI therapy 40 mg twice daily for minimum of 8 weeks post discharge with outpatient follow-up. -    Thank you for allowing us to take part in the patients care  Contact GI for any remaining questions, we are available. Electronically signed by:  Elyssa Esparza MD     Gastroenterology  7/20/2022    5:32 PM     his note is created with the assistance of a speech recognition program.  While intending to generate a document that actually reflects the content of the visit, the document can still have some errors including those of syntax and sound a like substitutions which may escape proof reading. It such instances, actual meaning can be extrapolated by contextual diversion.

## 2022-07-20 NOTE — PLAN OF CARE
Problem: Discharge Planning  Goal: Discharge to home or other facility with appropriate resources  Outcome: Progressing  Flowsheets (Taken 7/19/2022 2000)  Discharge to home or other facility with appropriate resources:   Identify barriers to discharge with patient and caregiver   Arrange for needed discharge resources and transportation as appropriate   Identify discharge learning needs (meds, wound care, etc)   Problem: Musculoskeletal - Adult  Goal: Return mobility to safest level of function  Outcome: Progressing  Flowsheets (Taken 7/19/2022 2000)  Return Mobility to Safest Level of Function:   Assess patient stability and activity tolerance for standing, transferring and ambulating with or without assistive devices   Assist with transfers and ambulation using safe patient handling equipment as needed   Apply continuous passive motion per provider or physical therapy orders to increase flexion toward goal   Problem: Musculoskeletal - Adult  Goal: Maintain proper alignment of affected body part  Outcome: Progressing   Problem: Musculoskeletal - Adult  Goal: Return ADL status to a safe level of function  Outcome: Progressing  Flowsheets (Taken 7/19/2022 2000)  Return ADL Status to a Safe Level of Function:   Administer medication as ordered   Assess activities of daily living deficits and provide assistive devices as needed   Problem: Gastrointestinal - Adult  Goal: Minimal or absence of nausea and vomiting  Outcome: Progressing  Flowsheets (Taken 7/19/2022 2000)  Minimal or absence of nausea and vomiting:   Administer IV fluids as ordered to ensure adequate hydration   Provide nonpharmacologic comfort measures as appropriate   Advance diet as tolerated, if ordered   Problem: Gastrointestinal - Adult  Goal: Maintains or returns to baseline bowel function  Outcome: Progressing  Flowsheets (Taken 7/19/2022 2000)  Maintains or returns to baseline bowel function:   Assess bowel function   Encourage oral fluids to ensure adequate hydration   Administer IV fluids as ordered to ensure adequate hydration   Administer ordered medications as needed   Encourage mobilization and activity   Problem: Gastrointestinal - Adult  Goal: Maintains adequate nutritional intake  Outcome: Progressing  Flowsheets (Taken 7/19/2022 2000)  Maintains adequate nutritional intake:   Monitor percentage of each meal consumed   Identify factors contributing to decreased intake, treat as appropriate   Assist with meals as needed   Monitor intake and output, weight and lab values  Problem: Infection - Adult  Goal: Absence of infection at discharge  Outcome: Progressing  Flowsheets  Taken 7/20/2022 0100  Absence of infection at discharge:   Assess and monitor for signs and symptoms of infection   Monitor lab/diagnostic results   Monitor all insertion sites i.e., indwelling lines, tubes and drains   Administer medications as ordered   Instruct and encourage patient and family to use good hand hygiene technique  Taken 7/19/2022 2000  Absence of infection at discharge:   Assess and monitor for signs and symptoms of infection   Monitor lab/diagnostic results   Monitor all insertion sites i.e., indwelling lines, tubes and drains   Administer medications as ordered   Instruct and encourage patient and family to use good hand hygiene technique   Problem: Infection - Adult  Goal: Absence of infection during hospitalization  Outcome: Progressing  Flowsheets  Taken 7/20/2022 0100  Absence of infection during hospitalization:   Assess and monitor for signs and symptoms of infection   Monitor lab/diagnostic results   Monitor all insertion sites i.e., indwelling lines, tubes and drains   Administer medications as ordered  Taken 7/19/2022 2000  Absence of infection during hospitalization:   Assess and monitor for signs and symptoms of infection   Monitor lab/diagnostic results   Monitor all insertion sites i.e., indwelling lines, tubes and drains   Administer medications as ordered   Problem: Infection - Adult  Goal: Absence of fever/infection during anticipated neutropenic period  Outcome: Progressing  Flowsheets (Taken 7/20/2022 0100)  Absence of fever/infection during anticipated neutropenic period: Monitor white blood cell count   Problem: Metabolic/Fluid and Electrolytes - Adult  Goal: Electrolytes maintained within normal limits  Outcome: Progressing  Flowsheets (Taken 7/19/2022 2000)  Electrolytes maintained within normal limits:   Monitor labs and assess patient for signs and symptoms of electrolyte imbalances   Administer electrolyte replacement as ordered   Monitor response to electrolyte replacements, including repeat lab results as appropriate   Problem: Metabolic/Fluid and Electrolytes - Adult  Goal: Glucose maintained within prescribed range  Outcome: Progressing   Problem: Hematologic - Adult  Goal: Maintains hematologic stability  Outcome: Progressing  Flowsheets (Taken 7/19/2022 2000)  Maintains hematologic stability:   Assess for signs and symptoms of bleeding or hemorrhage   Monitor labs for bleeding or clotting disorders   Administer blood products/factors as ordered   Problem: Safety - Adult  Goal: Free from fall injury  Outcome: Progressing  Flowsheets (Taken 7/20/2022 0100)  Free From Fall Injury: Based on caregiver fall risk screen, instruct family/caregiver to ask for assistance with transferring infant if caregiver noted to have fall risk factors   Problem: ABCDS Injury Assessment  Goal: Absence of physical injury  Outcome: Progressing  Flowsheets (Taken 7/20/2022 0100)  Absence of Physical Injury: Implement safety measures based on patient assessment

## 2022-07-20 NOTE — CARE COORDINATION
07/20/22 0854   Service Assessment   Patient Orientation Alert and Oriented   Cognition Alert   History Provided By Patient   Primary Caregiver Self   Support Systems Spouse/Significant Other;Children   Patient's Healthcare Decision Maker is: Patient Declined (Legal Next of Kin Remains as Decision Maker)   PCP Verified by CM Yes   Last Visit to PCP Within last 6 months   Prior Functional Level Independent in ADLs/IADLs   Current Functional Level Independent in ADLs/IADLs   Social/Functional History   Lives With Spouse   Type of [address] One level   [facility] [address] to enter without rails   Entrance Stairs - Number of Steps 2   Bathroom Shower/Tub Tub/Shower unit   [address] [facility], rolling;Cane   ADL Assistance Independent   Homemaking Assistance Independent   Ambulation Assistance Independent   Transfer Assistance Independent   Active  No   Discharge Planning   Type of hospitals Prior To Admission None   Potential Assistance Needed N/A   DME Ordered? No   Patient expects to be discharged to: [facility] SNF choices/therapy evals    1200 pt worked with therapy and daughter present, did well.  Agreeable to d/c home, offered home care freedom of choice

## 2022-07-20 NOTE — PROGRESS NOTES
[facility]  Office: [address], DO, Stew Skleonardos, DO, Bimal Gaxiola, DO, Sarah Stevens Blood, DO, Femi Caballero MD, Afsaneh Munoz MD, Terrence Harvey MD, Adriana Banks MD,  Harrison Berry MD, Miguel Angel England MD, Daniel Ta, DO, Madi Yeung MD,  Kayla Harrington MD, David Mondragon MD, Coye Meigs, DO, Terrance Chisholm MD, Dmitri Dyer MD, Keith Ramirez MD, Kwabena Henry DO, Shyanne Levin MD, Jesse Mirza MD, Beny Pagan, CNP,  Margareth Plunkett, CNP, Jerica Martines, CNP, Kimo Pryor, CNP, FIONA SmithC, Dolores Melara, DNP, Savanna Lemus, CNP, Quynh Mcclure, CNP, Jesús Lambert, CNP, Jian Mendez, CNP, Starla Tyler, CNS, Karen Sellers, DNP, Derian Miller, CNP, Misha Mcdonald, CNP, Sigrid Pablo, CNP, Sonido Parikh, CNP           [facility]    Progress Note    7/20/2022    11:52 AM    Name:   Alfredo Dillard  MRN:     [id number]     Acct:      [de-identified]   Room:   [address] [place] [address] Day:  3  Admit Date:  7/17/2022  8:11 PM    PCP:   Heather Aceves MD  Code Status:  DNR-CCA    Subjective:     C/C:   Chief Complaint   Patient presents with    Fatigue     Decreased appetite starting Tuesday     Diarrhea     Patient seen in follow-up for fatigue, symptomatic anemia, diarrhea secondary to upper GI bleed/duodenal ulcer. Patient states \"I am feeling better\"    Interval History Status: improved. Patient is doing better overall. His hemoglobin has remained stable. At this point in time he is tolerating a full liquid diet and we will advance to a regular diet. Per GI recommendations he will be maintained on IV Protonix for total of 72 hours post EGD. Once okay with GI we will transition him to oral Protonix with disposition home. Patient wishes to be discharged home however therapy is to work with patient along with daughter to determine what safe disposition should be considered.   At this point in time patient denies any questions or concerns. Brief History: This very pleasant 41-year-old male presented to hospital with fatigue as well as diarrhea. He was found to have symptomatic anemia secondary to upper GI bleed due to duodenal ulcer. He is presently maintained on IV Protonix and his diet is being advanced. Hemoglobin is presently stable following 3 units of packed red blood cells. Review of Systems:     Constitutional: Positive for fatigue. Patient denies any fevers or chills  Respiratory:  negative for cough, dyspnea on exertion, shortness of breath, wheezing  Cardiovascular:  negative for chest pain, chest pressure/discomfort, lower extremity edema, palpitations  Gastrointestinal:  negative for abdominal pain, constipation, diarrhea, nausea, vomiting  Neurological:  negative for dizziness, headache    Medications: Allergies:  No Known Allergies    Current Meds:   Scheduled Meds:    pantoprazole (PROTONIX) 40 mg injection  40 mg IntraVENous Q12H    sucralfate  1 g Oral 4 times per day    ARIPiprazole  2 mg Oral Daily    [Held by provider] apixaban  5 mg Oral BID    atorvastatin  40 mg Oral Daily    escitalopram  10 mg Oral Daily    [Held by provider] metoprolol tartrate  25 mg Oral BID    sodium chloride flush  5-40 mL IntraVENous 2 times per day     Continuous Infusions:    sodium chloride      sodium chloride 75 mL/hr at 07/19/22 9070    sodium chloride       PRN Meds: sodium chloride, sodium chloride flush, sodium chloride, ondansetron **OR** ondansetron, acetaminophen **OR** acetaminophen    Data:     Past Medical History:   has a past medical history of Heart attack ([facility] [address] High cholesterol, Hypertension, and Stroke ([facility] [address] Social History:   reports that he has never smoked. He has never used smokeless tobacco. He reports that he does not currently use alcohol. He reports that he does not use drugs.      Family History:   Family History   Problem Relation Age of Onset Colon Cancer Mother     Other Father        Vitals:  BP (!) 131/54   Pulse 63   Temp 97.7 °F (36.5 °C) (Oral)   Resp 18   Ht 5' 10\" (1.778 m)   Wt 189 lb 9.5 oz (86 kg)   SpO2 95%   BMI 27.20 kg/m²   Temp (24hrs), Av.6 °F (36.4 °C), Min:97.1 °F (36.2 °C), Max:98.2 °F (36.8 °C)    No results for input(s): POCGLU in the last 72 hours. I/O (24Hr): Intake/Output Summary (Last 24 hours) at 2022 1152  Last data filed at 2022 1700  Gross per 24 hour   Intake 327.03 ml   Output --   Net 327.03 ml       Labs:  Hematology:  Recent Labs     226 22  1059 22  1713 22  2306 22  0454   WBC 8.4  --   --   --   --   --   --    RBC 1.59*  --   --   --   --   --   --    HGB 4.9*  --  6.1*   < > 6.8* 7.2* 7.1*   HCT 14.9*  --  18.4*   < > 21.0* 21.8* 21.6*   MCV 94.1  --   --   --   --   --   --    MCH 30.9  --   --   --   --   --   --    MCHC 32.8  --   --   --   --   --   --    RDW 15.6*  --   --   --   --   --   --    *  --   --   --   --   --   --    MPV 7.4  --   --   --   --   --   --    INR  --  1.3 1.3  --   --   --   --     < > = values in this interval not displayed.      Chemistry:  Recent Labs     22  2245 22  0446 22  0454     --  138 142   K 5.1  --  4.8 3.8     --  108* 112*   CO2 21  --  22 23   GLUCOSE 153*  --  131* 90   BUN 74*  --  71* 34*   CREATININE 1.69*  --  1.58* 1.36*   MG 1.8  --   --   --    ANIONGAP 12  --  8* 7*   LABGLOM 39*  --  42* 50*   GFRAA 47*  --  51* >60   CALCIUM 9.4  --  8.9 8.1*   PROBNP 2,810*  --   --   --    TROPHS 66* 59*  --   --      Recent Labs     22  0446   PROT 5.1* 4.5*   LABALBU 3.9 3.3*   AST 33 26   ALT 22 19   ALKPHOS 76 63   BILITOT 0.31 0.42   BILIDIR 0.08  --    LIPASE 61*  --      ABG:  Lab Results   Component Value Date/Time    FIO2 NOT REPORTED 2020 01:15 PM     No results found for: SPECIAL  No results found for: CULTURE    Radiology:  XR CHEST PORTABLE    Result Date: 7/17/2022  No acute cardiopulmonary process Presumed colonic interposition beneath the right hemidiaphragm identified prior chest x-ray. ., Similar finding if there is concern for free air, consider lateral decubitus x-rays of the abdomen.        Physical Examination:       General appearance:  alert, cooperative and no distress  Mental Status:  oriented to person, place and time and normal affect  Lungs:  clear to auscultation bilaterally, normal effort  Heart:  regular rate and rhythm, no murmur  Abdomen:  soft, nontender, nondistended, normal bowel sounds, no masses, hepatomegaly, splenomegaly  Extremities:  no edema, redness, tenderness in the calves  Skin:  no gross lesions, rashes, induration    Assessment:     Hospital Problems             Last Modified POA    * (Principal) GIB (gastrointestinal bleeding) 7/17/2022 Yes    Depression 7/18/2022 Yes    S/p TAVR (transcatheter aortic valve replacement), bioprosthetic 7/18/2022 Yes    Hyperlipidemia 7/18/2022 Yes    Long term (current) use of anticoagulants 7/18/2022 Yes    PAF (paroxysmal atrial fibrillation) ([address] 7/19/2022 Yes    Blood loss anemia 7/19/2022 Yes       Plan:     GI bleed/duodenal ulcer/AVM  Status post EGD along with electrocautery  Continue IV Protonix  Transition to oral Protonix when okay with GI  Advance diet to regular  Anemia of acute upper GI blood loss  Stable following transfusion  Status post 3 units packed red blood cells  Paroxysmal atrial fibrillation  Normal sinus rhythm  Resume Eliquis 7 days post EGD  Aortic valve disease  Status post TAVR    Bull Joyner DO  7/20/2022  11:52 AM

## 2022-07-20 NOTE — PROGRESS NOTES
Occupational Therapy  Facility/Department: [facility]  Occupational Therapy Initial Assessment      Name: Leverette Cushing  : 1935  MRN: [id number]  Date of Service: 2022    Chief Complaint   Patient presents with    Fatigue     Decreased appetite starting Tuesday     Diarrhea     Discharge Recommendations:  Patient would benefit from continued therapy after discharge  OT Equipment Recommendations  Equipment Needed: No     Patient Diagnosis(es): The primary encounter diagnosis was Gastrointestinal hemorrhage, unspecified gastrointestinal hemorrhage type. A diagnosis of Blood loss anemia was also pertinent to this visit. Past Medical History:  has a past medical history of Heart attack ([facility] [address] High cholesterol, Hypertension, and Stroke ([facility] [address] Past Surgical History:  has a past surgical history that includes Arterial bypass surgry (); Cardiac valve replacement (2019); Cardiac catheterization (); Mouth Biopsy (2019); Endoscopy, colon, diagnostic; Upper gastrointestinal endoscopy (2022); and Upper gastrointestinal endoscopy (N/A, 2022). Assessment   Performance deficits / Impairments: Decreased functional mobility ; Decreased endurance;Decreased cognition;Decreased safe awareness;Decreased strength;Decreased vision/visual deficit; Decreased balance;Decreased ROM; Decreased ADL status  Assessment: Pt has deficits at this time with his ability to independently / safely complete daily tasks, balance and mobility, endurance, and participation in functional tasks. At this time, the Pt has decreased ability to return to [facility] and prior living situation. He will benefit from continued participation in acute and post-acute OT services to improve independence / to improve functional activity participation.   Prognosis: Fair  Decision Making: Medium Complexity  REQUIRES OT FOLLOW-UP: Yes  Activity Tolerance  Activity Tolerance: Patient Tolerated treatment well;Patient limited by fatigue          Plan   Plan  Times per Week: 5-6x/week  Times per Day: Daily  Current Treatment Recommendations: Strengthening, ROM, Functional mobility training, Endurance training, Equipment evaluation, education, & procurement, Patient/Caregiver education & training, Safety education & training, Self-Care / ADL, Cognitive/Perceptual training       Restrictions  Restrictions/Precautions  Restrictions/Precautions: Fall Risk, Bedrest with Bathroom Privileges  Required Braces or Orthoses?: No      Subjective   General  Patient assessed for rehabilitation services?: Yes  Family / Caregiver Present: Yes (Pt's daughter)  Diagnosis: Fatigue, Diarrhea  Subjective  Subjective: RN approved Pt to be seen for OT Evaluation. General Comment  Comments: Pt was agreeable and cooperative; was motivated to participate. Social/Functional History  Social/Functional History  Lives With: Spouse  Type of Home: House  Home Layout: One level, Laundry in basement  Home Access: Stairs to enter with rails  Entrance Stairs - Number of Steps: 1+1  Entrance Stairs - Rails: Right  Bathroom Shower/Tub: Tub/Shower unit, Walk-in shower (walkin is in the basement and is what pt typically uses)  Bathroom Toilet: Handicap height  Bathroom Equipment: Shower chair, Grab bars in shower, Grab bars around toilet  Home Equipment: Walker, rolling, Cane (grab bar on the bed;  No recent use of equipment)  ADL Assistance: Independent  Homemaking Assistance: Independent  Homemaking Responsibilities: Yes  Meal Prep Responsibility: Secondary (light meal prep)  Laundry Responsibility: Primary  Cleaning Responsibility: Secondary (light cleaning; family assists with other)  Shopping Responsibility: No (Family)  Ambulation Assistance: Independent  Transfer Assistance: Independent  Active : No  Patient's  Info: Daughter  Mode of Transportation: Family  Occupation: Retired  Type of Occupation: [employer]  Leisure & Hobbies: Spend time with family, horses  Additional Comments: Pt daughter reports that they could have 24/7 assistance starting Friday for a couple weeks. Objective   Safety Devices  Type of Devices: Call light within reach;Gait belt;Left in chair;Nurse notified; Chair alarm in place  Restraints  Restraints Initially in Place: No    Bed Mobility Training  Bed Mobility Training: No (Sitting up in recliner at start and end of tx session)    Balance  Sitting: Intact  Standing: With support (Maintenance of dynamic standing balance (with RW, CGA, xseveral trials 1-3 mins each), Fair- balance.)  Transfer Training  Transfer Training: Yes  Overall Level of Assistance: Contact-guard assistance; Adaptive equipment (With RW)  Interventions: Tactile cues; Verbal cues (Mod Cues task initiation & sequencing / safety with AD / Ax pacing (to slow pace))  Sit to Stand: Contact-guard assistance; Adaptive equipment (With RW)  Stand to Sit: Contact-guard assistance  Stand Pivot Transfers: Contact-guard assistance; Adaptive equipment (With RW)  Toilet Transfer: Minimum assistance; Adaptive equipment; Additional time (Using RW and Elevated Toilet (Bilateral Grab Bars). Increased time / effort.)  Gait  Overall Level of Assistance: Contact-guard assistance; Additional time; Adaptive equipment (Functional Mobility in-room and in-bathroom using RW.)  Interventions: Verbal cues; Tactile cues (Mod Cues task initiation & sequencing / safety with AD / Ax pacing (to slow pace). No LOB.)    ADL  Grooming: Contact guard assistance;Verbal cueing; Adaptive equipment  Grooming Skilled Clinical Factors: Standing at the sink with  RW for hand and face hygiene. Cues for safe and accurate placement of RW.  UE Bathing: Minimal assistance;Verbal cueing; Adaptive equipment; Increased time to complete  UE Bathing Skilled Clinical Factors: Based on clinical judgemnet. LE Bathing: Moderate assistance; Increased time to complete; Adaptive equipment;Verbal cueing  LE Bathing Skilled Clinical Factors: Based on clinical judgemet. UE Dressing: Minimal assistance; Increased time to complete;Verbal cueing  UE Dressing Skilled Clinical Factors: Seated in recliner to doff and don gown. Cues for task initiation / sequencing, increased time and effort by Pt. LE Dressing: Verbal cueing; Increased time to complete;Minimal assistance; Moderate assistance; Adaptive equipment  LE Dressing Skilled Clinical Factors: Mod Assist to doff / don Bilateral socks while seated in recliner chair. Min Assist standing balance (with RW) for pants / brief mgmt. Toileting: Moderate assistance;Minimal assistance; Adaptive equipment; Increased time to complete  Toileting Skilled Clinical Factors: SBA toilet hygiene while seated. Min Assist standing balance (with RW) for pants / brief mgmt. Activity Tolerance  Activity Tolerance: Patient tolerated treatment well;Patient tolerated evaluation without incident    Vision  Vision: Impaired  Vision Exceptions: Wears glasses at all times; Visual field cut (Field cut (unsure severity of) Right visual field.)  Hearing  Hearing: Exceptions to [facility]  Hearing Exceptions: Hard of hearing/hearing concerns    Cognition  Overall Cognitive Status: Exceptions  Following Commands: Follows multistep commands with increased time; Follows multistep commands with repitition  Safety Judgement: Decreased awareness of need for assistance;Decreased awareness of need for safety  Problem Solving: Assistance required to generate solutions;Assistance required to identify errors made  Insights: Decreased awareness of deficits  Initiation: Requires cues for some  Sequencing: Requires cues for some    LUE AROM (degrees)  LUE AROM : WFL  LUE General AROM: BUE MMT 4-/5  Left Hand AROM (degrees)  Left Hand AROM: WFL  Left Hand General AROM: Left Hand / Grasp 4-/5  RUE AROM (degrees)  RUE AROM : WFL  RUE General AROM: BUE MMT 4-/5  Right Hand AROM (degrees)  Right Hand AROM: WFL  Right Hand General AROM: Right Hand / Grasp 4-/5.      AM-PAC Score  AM-PAC Inpatient Daily Activity Raw Score: 16 (07/20/22 1648)  AM-PAC Inpatient ADL T-Scale Score : 35.96 (07/20/22 1648)  ADL Inpatient CMS 0-100% Score: 53.32 (07/20/22 1648)  ADL Inpatient CMS G-Code Modifier : CK (07/20/22 1648)      Goals  Short Term Goals  Time Frame for Short term goals: Within 14 treatment sessions  Short Term Goal 1: Pt will demo Supervision Assist and Fair Carryover of EC / Ax Pacing during functional tasks & mobility. Short Term Goal 2: Pt will participate in LB ADLs and Toileting with SBA with Fair Integration of AE / DME. Short Term Goal 3: Pt will complete ADL and Bathroom Transfers with Supervision Assist without LOB. Short Term Goal 4: Pt will maintain Fair Dynamic Standing Balance (5-7 mins) while participating in Leisure tasks. Short Term Goal 5: Pt will complete Functional Mobility (with item retrieval / transport) with Supervision Assist and Correct Use of AD / DME.       Therapy Time   Individual Concurrent Group Co-treatment   Time In 1042         Time Out 1112         Minutes 30         Timed Code Treatment Minutes: 23 Minutes (ADL)      SHANTE Cordon, OTR/L

## 2022-07-20 NOTE — PLAN OF CARE
Problem: Discharge Planning  Goal: Discharge to home or other facility with appropriate resources  Outcome: Progressing     Problem: Gastrointestinal - Adult  Goal: Maintains adequate nutritional intake  Outcome: Progressing     Problem: Hematologic - Adult  Goal: Maintains hematologic stability  Outcome: Progressing

## 2022-07-20 NOTE — PROGRESS NOTES
Physical Therapy  Facility/Department: [facility] SURG ICU  Physical Therapy Initial Assessment    Name: Vida Gomes  : 1935  MRN: [id number]  Date of Service: 2022  Chief Complaint   Patient presents with    Fatigue     Decreased appetite starting Tuesday     Diarrhea       Discharge Recommendations:  Patient would benefit from continued therapy after discharge   PT Equipment Recommendations  Equipment Needed: No  Other: Family states they own a RW; writer recommends use of device at all times      Patient Diagnosis(es): The primary encounter diagnosis was Gastrointestinal hemorrhage, unspecified gastrointestinal hemorrhage type. A diagnosis of Blood loss anemia was also pertinent to this visit. Past Medical History:  has a past medical history of Heart attack ([facility] [address] High cholesterol, Hypertension, and Stroke ([facility] [address] Past Surgical History:  has a past surgical history that includes Arterial bypass surgry (); Cardiac valve replacement (2019); Cardiac catheterization (); Mouth Biopsy (2019); Endoscopy, colon, diagnostic; Upper gastrointestinal endoscopy (2022); and Upper gastrointestinal endoscopy (N/A, 2022). Assessment   Body Structures, Functions, Activity Limitations Requiring Skilled Therapeutic Intervention: Decreased functional mobility ; Decreased strength;Decreased endurance;Decreased balance;Decreased safe awareness  Assessment: Pt with fair overall mobility this date- pt with noted LE fatigue with gait but able to ambulate household distances with CGA. Pt will need 24/7 assistance upon discharge and will benefit from further therapy. Family states they already own equipment and agreeable to use.   Therapy Prognosis: Good  Decision Making: Medium Complexity  Requires PT Follow-Up: Yes  Activity Tolerance  Activity Tolerance: Patient tolerated treatment well;Patient tolerated evaluation without incident     Plan   Plan  Plan:  (5-6x)  Current Treatment Recommendations: Strengthening, Balance training, Functional mobility training, Transfer training, Endurance training, Gait training, Stair training, Safety education & training, Patient/Caregiver education & training, Home exercise program, Therapeutic activities  Safety Devices  Type of Devices: Call light within reach, Gait belt, Left in chair, Nurse notified, Chair alarm in place  Restraints  Restraints Initially in Place: No     Restrictions  Restrictions/Precautions  Restrictions/Precautions: Fall Risk, Bedrest with Bathroom Privileges  Required Braces or Orthoses?: No     Subjective   General  Patient assessed for rehabilitation services?: Yes  Response To Previous Treatment: Not applicable  Family / Caregiver Present: Yes (daughter)  Follows Commands: Within Functional Limits  Subjective  Subjective: Pt supine in bed upon arrival and agreeable to therapy. Pt denies any pain at rest. RN agreeable to therapy. Social/Functional History  Social/Functional History  Lives With: Spouse  Type of Home: House  Home Layout: One level, Laundry in basement  Home Access: Stairs to enter with rails  Entrance Stairs - Number of Steps: 1+1  Entrance Stairs - Rails: Right  Bathroom Shower/Tub: Tub/Shower unit, Walk-in shower (walkin is in the basement and is what pt typically uses)  Bathroom Toilet: Handicap height  Bathroom Equipment: Shower chair, Grab bars in shower, Grab bars around toilet  Home Equipment: Walker, rolling, Cane (grab bar on the bed;  No recent use of equipment)  ADL Assistance: Independent  Homemaking Assistance: Independent  Homemaking Responsibilities: Yes  Meal Prep Responsibility: Secondary (light meal prep)  Laundry Responsibility: Primary  Cleaning Responsibility: Secondary (light cleaning; family assists with other)  Shopping Responsibility: No (Family)  Ambulation Assistance: Independent  Transfer Assistance: Independent  Active : No  Patient's  Info: Daughter  Mode of Transportation: Family  Occupation: Retired  Type of Occupation: [employer]  Leisure & Hobbies: Spend time with family, horses  Additional Comments: Pt daughter reports that they could have 24/7 assistance starting Friday for a couple weeks. Vision/Hearing  Vision  Vision: Impaired  Vision Exceptions: Wears glasses at all times  Hearing  Hearing: Exceptions to [facility]  Hearing Exceptions: Hard of hearing/hearing concerns    Cognition   Cognition  Overall Cognitive Status: Exceptions  Following Commands:  Follows multistep commands with increased time  Safety Judgement: Decreased awareness of need for assistance  Problem Solving: Assistance required to generate solutions;Assistance required to identify errors made  Insights: Decreased awareness of deficits  Initiation: Requires cues for some  Sequencing: Requires cues for some     Objective        Gross Assessment  Sensation: Intact (Pt denies any numbness or tingling)     AROM RLE (degrees)  RLE AROM: WFL  AROM LLE (degrees)  LLE AROM : WFL  AROM RUE (degrees)  RUE AROM : WFL  AROM LUE (degrees)  LUE AROM : WFL  Strength RLE  Comment: grossly 4/5  Strength LLE  Comment: grossly 4/5  Strength RUE  Comment: grossly 4/5  Strength LUE  Comment: grossly 4/5           Bed mobility  Supine to Sit: Stand by assistance (HOB raised 30 degrees, no use of bed rail)  Sit to Supine:  (retired to chair at end of session)  Scooting: Stand by assistance  Transfers  Sit to Stand: Stand by assistance  Stand to sit: Stand by assistance  Ambulation  Surface: level tile  Device: Rolling Walker  Assistance: Contact guard assistance  Quality of Gait: cues to avoid obstacles on the right- difficulty even with cues but able to maintain balance with reaction to hitting obstacles  Gait Deviations: Slow Belinda;Decreased step length;Decreased step height  Distance: 85ft, standing rest break, 65ft  More Ambulation?: Yes  Ambulation 2  Surface - 2: level tile  Device 2: No device  Assistance 2: Contact guard assistance  Quality of Gait 2: shuffling gait with forward flexed posture, decreased balance compared to no device  Gait Deviations: Slow Belinda;Decreased step length;Decreased step height;Shuffles  Distance: 20ft     Balance  Posture: Fair  Sitting - Static: Good  Sitting - Dynamic: Good;-  Standing - Static: Fair;+  Standing - Dynamic: Fair;+  Comments: standing balance assessed with RW; fair without device             AM-PAC Score  AM-PAC Inpatient Mobility Raw Score : 16 (07/20/22 1321)  AM-PAC Inpatient T-Scale Score : 40.78 (07/20/22 1321)  Mobility Inpatient CMS 0-100% Score: 54.16 (07/20/22 1321)  Mobility Inpatient CMS G-Code Modifier : CK (07/20/22 1321)          Goals  Short Term Goals  Time Frame for Short term goals: 14 visits  Short term goal 1: Pt to ambulate 300ft modified independently with RW  Short term goal 2: Pt to sit <> stand transfer independently  Short term goal 3: Pt to demonstrate independent bed mobility  Short term goal 4: Pt to ascend/descend two steps with right rail modified independently  Short term goal 5: Pt to demonstrate good standing balance to decrease risk of falls with use of AD       Education  Patient Education  Education Given To: Patient  Education Provided: Role of Therapy;Plan of Care;Equipment  Education Provided Comments: Importance of use of RW at all times  Education Method: Demonstration  Barriers to Learning: Cognition  Education Outcome: Verbalized understanding      Therapy Time   Individual Concurrent Group Co-treatment   Time In 1010         Time Out 1039         Minutes 29         Timed Code Treatment Minutes: [address], PT

## 2022-07-20 NOTE — FLOWSHEET NOTE
SPIRITUAL CARE DEPARTMENT - [facility] [address]  PROGRESS NOTE    Shift date: 22    Shift day: Wednesday   Shift # 1    Room # 867/619-44   Name: Stefani Espinosa                Quaker: [address] of Mormonism: Anna Perez    Referral: Routine Visit    Admit Date & Time: 2022  8:11 PM    Assessment:  Stefani Espinosa is a 81 y.o. male in the hospital. Upon entering the room writer observed patient sitting up in a chair eating ice cream and a visitor, who introduced herself as the patient's wife Naya Alcala, was seated on the couch beside him. The writer noticed that the patient appeared cheerful and both he and his wife seemed to be coping well. Through conversation, the writer learned that the patient suffered a stroke three years prior, shortly after his youngest son  in a tragic work accident, and experienced communication challenges as a result. Intervention:  Writer introduced self and title as  Writer offered space for the patient and his wife  to express feelings, needs, and concerns and provided a ministry presence. The writer explored sources of osfia and hope for the patient and his wife and learned that they find comfort in their abril and family. The writer learned more about the patient's family, the couple's mobility limitations, and how they presently find sofia in their days. 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 The couple enjoys spending time with their many grandchildren and great-grandchildren. The writer learned that the patient and his wife were open to prayer, and offered words of hope and healing. Outcome: The patient and his wife expressed gratitude for the visit. Plan:  Chaplains will remain available to offer spiritual and emotional support as needed. 22 1528   Encounter Summary   Service Provided For: Patient; Family Referral/Consult From: Multi-disciplinary team   Support System Spouse; Children   Last Encounter  07/20/22   Complexity of Encounter Moderate   Begin Time 1500   End Time  1530   Total Time Calculated 30 min   Encounter    Type Initial Screen/Assessment   Spiritual/Emotional needs   Type Spiritual Support   Assessment/Intervention/Outcome   Assessment Calm; Complicated grieving; Impaired social interaction;Coping   Intervention Active listening;Discussed belief system/Yazdanism practices/abril; Explored/Affirmed feelings, thoughts, concerns;Explored Coping Skills/Resources;Grief Care;Sustaining Presence/Ministry of presence;Prayer (assurance of)/Cooter   Outcome Comfort;Engaged in conversation;Expressed feelings, needs, and concerns;Expressed feelings of Gaye, Peace and/or Love;Expressed Gratitude     Electronically signed by Stevie Hurt on 7/20/2022 at 3:30 PM.  [facility]  [phone]

## 2022-07-21 VITALS
HEIGHT: 70 IN | DIASTOLIC BLOOD PRESSURE: 47 MMHG | WEIGHT: 206.57 LBS | TEMPERATURE: 97.5 F | BODY MASS INDEX: 29.57 KG/M2 | SYSTOLIC BLOOD PRESSURE: 118 MMHG | RESPIRATION RATE: 20 BRPM | OXYGEN SATURATION: 97 % | HEART RATE: 65 BPM

## 2022-07-21 LAB
HCT VFR BLD CALC: 21.1 % (ref 41–53)
HCT VFR BLD CALC: 22.8 % (ref 41–53)
HEMOGLOBIN: 6.8 G/DL (ref 13.5–17.5)
HEMOGLOBIN: 7.3 G/DL (ref 13.5–17.5)

## 2022-07-21 PROCEDURE — 85018 HEMOGLOBIN: CPT

## 2022-07-21 PROCEDURE — 6370000000 HC RX 637 (ALT 250 FOR IP): Performed by: INTERNAL MEDICINE

## 2022-07-21 PROCEDURE — 86900 BLOOD TYPING SEROLOGIC ABO: CPT

## 2022-07-21 PROCEDURE — 86850 RBC ANTIBODY SCREEN: CPT

## 2022-07-21 PROCEDURE — 99232 SBSQ HOSP IP/OBS MODERATE 35: CPT | Performed by: HOSPITALIST

## 2022-07-21 PROCEDURE — 6370000000 HC RX 637 (ALT 250 FOR IP): Performed by: HOSPITALIST

## 2022-07-21 PROCEDURE — 6370000000 HC RX 637 (ALT 250 FOR IP): Performed by: CLINICAL NURSE SPECIALIST

## 2022-07-21 PROCEDURE — 6370000000 HC RX 637 (ALT 250 FOR IP): Performed by: STUDENT IN AN ORGANIZED HEALTH CARE EDUCATION/TRAINING PROGRAM

## 2022-07-21 PROCEDURE — 36415 COLL VENOUS BLD VENIPUNCTURE: CPT

## 2022-07-21 PROCEDURE — 85014 HEMATOCRIT: CPT

## 2022-07-21 PROCEDURE — 86920 COMPATIBILITY TEST SPIN: CPT

## 2022-07-21 PROCEDURE — 86901 BLOOD TYPING SEROLOGIC RH(D): CPT

## 2022-07-21 RX ORDER — PANTOPRAZOLE SODIUM 40 MG/1
40 TABLET, DELAYED RELEASE ORAL
Status: DISCONTINUED | OUTPATIENT
Start: 2022-07-21 | End: 2022-07-21 | Stop reason: HOSPADM

## 2022-07-21 RX ORDER — SODIUM CHLORIDE 9 MG/ML
INJECTION, SOLUTION INTRAVENOUS PRN
Status: DISCONTINUED | OUTPATIENT
Start: 2022-07-21 | End: 2022-07-21

## 2022-07-21 RX ORDER — SUCRALFATE 1 G/1
1 TABLET ORAL 4 TIMES DAILY
Qty: 120 TABLET | Refills: 0 | Status: SHIPPED | OUTPATIENT
Start: 2022-07-21

## 2022-07-21 RX ORDER — PANTOPRAZOLE SODIUM 40 MG/1
40 TABLET, DELAYED RELEASE ORAL
Qty: 60 TABLET | Refills: 3 | Status: SHIPPED | OUTPATIENT
Start: 2022-07-21

## 2022-07-21 RX ADMIN — ESCITALOPRAM OXALATE 10 MG: 10 TABLET ORAL at 08:31

## 2022-07-21 RX ADMIN — SUCRALFATE 1 G: 1 TABLET ORAL at 06:15

## 2022-07-21 RX ADMIN — SUCRALFATE 1 G: 1 TABLET ORAL at 12:06

## 2022-07-21 RX ADMIN — ARIPIPRAZOLE 2 MG: 2 TABLET ORAL at 08:31

## 2022-07-21 RX ADMIN — ATORVASTATIN CALCIUM 40 MG: 40 TABLET, FILM COATED ORAL at 08:31

## 2022-07-21 RX ADMIN — PANTOPRAZOLE SODIUM 40 MG: 40 TABLET, DELAYED RELEASE ORAL at 10:07

## 2022-07-21 RX ADMIN — METOPROLOL TARTRATE 12.5 MG: 25 TABLET, FILM COATED ORAL at 08:31

## 2022-07-21 NOTE — PLAN OF CARE
Problem: Discharge Planning  Goal: Discharge to home or other facility with appropriate resources  7/21/2022 0159 by Darliss Schlatter, RN  Outcome: Progressing   Problem: Musculoskeletal - Adult  Goal: Return mobility to safest level of function  7/21/2022 0159 by Darliss Schlatter, RN  Outcome: Progressing   Problem: Musculoskeletal - Adult  Goal: Maintain proper alignment of affected body part  7/21/2022 0159 by Darliss Schlatter, RN  Outcome: Progressing   Problem: Musculoskeletal - Adult  Goal: Return ADL status to a safe level of function  7/21/2022 0159 by Darliss Schlatter, RN  Outcome: Progressing   Problem: Gastrointestinal - Adult  Goal: Minimal or absence of nausea and vomiting  7/21/2022 0159 by Darliss Schlatter, RN  Outcome: Progressing   Problem: Gastrointestinal - Adult  Goal: Maintains or returns to baseline bowel function  7/21/2022 0159 by Darliss Schlatter, RN  Outcome: Progressing   Problem: Gastrointestinal - Adult  Goal: Maintains adequate nutritional intake  7/21/2022 0159 by Darliss Schlatter, RN  Outcome: Progressing   Problem: Gastrointestinal - Adult  Goal: Establish and maintain optimal ostomy function  7/21/2022 0159 by Darliss Schlatter, RN  Outcome: Progressing   Problem: Infection - Adult  Goal: Absence of infection at discharge  7/21/2022 0159 by Darliss Schlatter, RN  Outcome: Progressing   Problem: Infection - Adult  Goal: Absence of infection during hospitalization  7/21/2022 0159 by Darliss Schlatter, RN  Outcome: Progressing   Problem: Infection - Adult  Goal: Absence of fever/infection during anticipated neutropenic period  7/21/2022 0159 by Darliss Schlatter, RN  Outcome: Progressing   Problem: Metabolic/Fluid and Electrolytes - Adult  Goal: Electrolytes maintained within normal limits  7/21/2022 0159 by Darliss Schlatter, RN  Outcome: Progressing   Problem: Metabolic/Fluid and Electrolytes - Adult  Goal: Glucose maintained within prescribed range  7/21/2022 0159 by Darliss Schlatter, RN  Outcome: Progressing   Problem: Hematologic - Adult  Goal: Maintains hematologic stability  7/21/2022 0159 by Emile Clay RN  Outcome: Progressing   Problem: Safety - Adult  Goal: Free from fall injury  7/21/2022 0159 by Emile Clay RN  Outcome: Progressing   Problem: ABCDS Injury Assessment  Goal: Absence of physical injury  7/21/2022 0159 by Emile Clay RN  Outcome: Progressing

## 2022-07-21 NOTE — DISCHARGE INSTR - COC
Continuity of Care Form    Patient Name: Lola Crowder   :  1935  MRN:  [id number]    [facility] date:  2022  Discharge date:        Code Status Order: DNR-CCA   Advance Directives:     Admitting Physician:  Eladia Velasquez DO  PCP: Josr Rosales MD    Discharging Nurse: Candace Banks RN  [address] Unit/Room#: 937/218-42  Discharging Unit Phone Number: [phone]    Emergency Contact:   Extended Emergency Contact Information  Primary Emergency Contact: [address], [address] [place] [address] Road Phone: [phone]  Relation: Spouse  Preferred language: English  Secondary Emergency Contact: Ralph [address] Phone: [phone]  Mobile Phone: [phone]  Relation: Child    Past Surgical History:  Past Surgical History:   Procedure Laterality Date    ARTERIAL BYPASS SURGRY  2004    CARDIAC CATHETERIZATION      CARDIAC VALVE REPLACEMENT  2019    ENDOSCOPY, COLON, DIAGNOSTIC      MOUTH BIOPSY  2019    tonsil     UPPER GASTROINTESTINAL ENDOSCOPY  2022    UPPER GASTROINTESTINAL ENDOSCOPY N/A 2022    EGD CONTROL HEMORRHAGE performed by Amado Randall MD at [address] [place] [address].       Immunization History:   Immunization History   Administered Date(s) Administered    COVID-19, PFIZER GRAY top, DO NOT Dilute, (age 15 y+), IM, 30 mcg/0.3 mL 2022    COVID-19, PFIZER PURPLE top, DILUTE for use, (age 15 y+), 30mcg/0.3mL 2021, 2021, 11/10/2021    Influenza Vaccine, unspecified formulation 10/08/2010    Influenza, Triv, inactivated, subunit, adjuvanted, IM (Fluad 65 yrs and older) 2018, 10/07/2019    Pneumococcal Conjugate 13-valent (Savanna Kila) 2016    Tdap (Boostrix, Adacel) 2020       Active Problems:  Patient Active Problem List   Diagnosis Code    Stenosis of carotid artery I65.29    S/p TAVR (transcatheter aortic valve replacement), 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 differentiation, 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 A       Isolation/Infection:   Isolation            No Isolation          Patient Infection Status       Infection Onset Added Last Indicated Last Indicated By Review Planned Expiration Resolved Resolved By    None active    Resolved    C-diff Rule Out 22 Gastrointestinal Panel, Molecular (Ordered)   22 Sudha Johnston RN    Order             Nurse Assessment:  Last Vital Signs: BP (!) 118/47   Pulse 65   Temp 97.5 °F (36.4 °C) (Oral)   Resp 20   Ht 5' 10\" (1.778 m)   Wt 206 lb 9.1 oz (93.7 kg)   SpO2 97%   BMI 29.64 kg/m²     Last documented pain score (0-10 scale):    Last Weight:   Wt Readings from Last 1 Encounters:   22 206 lb 9.1 oz (93.7 kg)     Mental Status:  oriented, alert, coherent, logical, thought processes intact, and expressive Pratibha Alejandra    IV Access:  [facility] IV VRZYSD:[id number]}    Nursing Mobility/ADLs:  Walking   Assisted  Transfer  Assisted  Bathing  Assisted  Dressing  Assisted  Toileting  Independent  Feeding  Independent  Med Admin  Assisted  Med Delivery   whole    Wound Care Documentation and Therapy:        Elimination:  Continence:    Bowel: Yes  Bladder: Yes  Urinary Catheter: None   Colostomy/Ileostomy/Ileal Conduit: No Date of Last BM: 2022      Intake/Output Summary (Last 24 hours) at 2022 1205  Last data filed at 2022 0925  Gross per 24 hour   Intake 2813.68 ml   Output --   Net 2813.68 ml     I/O last 3 completed shifts: In: 1657.5 [I.V.:1657.5]  Out: -     Safety Concerns:      At Risk for Falls    Impairments/Disabilities:      Vision and Hearing    Nutrition Therapy:  Current Nutrition Therapy:   - Oral Diet:  General    Routes of Feeding: Oral  Liquids: No Restrictions  Daily Fluid Restriction: no  Last Modified Barium Swallow with Video (Video Swallowing Test): not done    Treatments at the Time of Hospital Discharge:   Respiratory Treatments: n/a    Oxygen Therapy:  {Therapy; copd oxygen:84585}  Ventilator:    { CC Vent FFN}    Rehab Therapies: {THERAPEUTIC INTERVENTION:5885479505}  Weight Bearing Status/Restrictions: {[facility] Weight Bearin}  Other Medical Equipment (for information only, NOT a DME order):  walker  Other Treatments: n/a      Patient's personal belongings (please select all that are sent with patient):  Glasses, Dentures upper and lower    RN SIGNATURE:  Electronically signed by Martin Mcintosh RN on 22 at 1:33 PM EDT    CASE MANAGEMENT/SOCIAL WORK SECTION    Inpatient Status Date: ***    Readmission Risk Assessment Score:  Readmission Risk              Risk of Unplanned Readmission:  [id number]           Discharging to Facility/ Agency   Name:   Address:  Phone:  Fax:    Dialysis Facility (if applicable)   Name:  Address:  Dialysis Schedule:  Phone:  Fax:    / signature: {Esignature:[id number]}    PHYSICIAN SECTION    Prognosis: Good    Condition at Discharge: Stable    Rehab Potential (if transferring to Rehab): Good    Recommended Labs or Other Treatments After Discharge: Restart Eliquis     Physician Certification: I certify the above information and transfer of   is necessary for the continuing treatment of the diagnosis listed and that he requires Home Care for greater 30 days.      Update Admission H&P: No change in H&P    PHYSICIAN SIGNATURE:  Electronically signed by Paola Mejias DO on 7/21/22 at 12:05 PM EDT

## 2022-07-21 NOTE — DISCHARGE SUMMARY
[facility]  Office: [address], DO, Olena Martin, DO, Krystal Pedroza, DO, [facility] Blood, DO, Kee Figueroa MD, Cynthia Peerira MD, Kaila Hui MD, Olu Haro MD,  Elsy Fiore MD, Gabriella Ivory MD, Vibha Perez, DO, Galileo Zeng MD,  Dino Fuentes MD, Marlena Woodard MD, Magali Red, DO, Yodit Gonzalez MD, Annalee Ortega MD, Mary Harrington MD, Les Lawler, DO, Fred Faye MD, Melia Orourke MD, Jeff Arango, CNP,  Sendy Sweet, CNP, Rinku Hutchins, CNP, Rochelle Alves, CNP, Mateusz Whitney PA-C, Ronald Iraheta, [facility], Makayla Johnson, CNP, Dea Goodwin, CNP, Barry Mcintosh, [facility], [facility] Fernie, CNP, Isabela Coley, CNS, Ita Nair, [facility], Brisa Mcclure, CNP, Anitra Navarrete, CNP, Felix Anthony, CNP, Hazel Kinseys, [address]    Discharge Summary     Patient ID: Leverette Cushing  :  1935   MRN: [id number]     ACCOUNT:  [de-identified]   Patient's PCP: Ash Crabtree MD  Admit Date: 2022   Discharge Date: 2022     Length of Stay: 4  Code Status:  DNR-CCA  Admitting Physician: Ayde Dennis DO  Discharge Physician: Ayde Dennis DO     Active Discharge Diagnoses:     Hospital Problem Lists:  Principal Problem:    GIB (gastrointestinal bleeding)  Active Problems:    Depression    S/p TAVR (transcatheter aortic valve replacement), bioprosthetic    Hyperlipidemia    Long term (current) use of anticoagulants    PAF (paroxysmal atrial fibrillation) (HCC)    Blood loss anemia  Resolved Problems:    * No resolved hospital problems.  *      Admission Condition:  fair     Discharged Condition: good    Hospital Stay:     Hospital Course:  Leverette Cushing is a 81 y.o. 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 ., Similar finding if there is concern for free air, consider lateral decubitus x-rays of the abdomen. Consultations:    Consults:     Final Specialist Recommendations/Findings:   IP CONSULT TO GI  IP CONSULT TO SOCIAL WORK  IP CONSULT TO HOME CARE NEEDS      The patient was seen and examined on day of discharge and this discharge summary is in conjunction with any daily progress note from day of discharge. Discharge plan:     Disposition: Home    Physician Follow Up:     Aimee Stanton MD  [address]  [address] [facility] [address] [facility] [address]  [phone]    Follow up      Diana Morris MD  [place] [address]  [phone]    Follow up         Requiring Further Evaluation/Follow Up POST HOSPITALIZATION/Incidental Findings: None    Diet: regular diet    Activity: As tolerated    Instructions to Patient: Resume Eliquis on July 25    Discharge Medications:      Medication List        START taking these medications      pantoprazole 40 MG tablet  Commonly known as: PROTONIX  Take 1 tablet by mouth in the morning and 1 tablet in the evening. Take before meals. sucralfate 1 GM tablet  Commonly known as: CARAFATE  Take 1 tablet by mouth in the morning and 1 tablet at noon and 1 tablet in the evening and 1 tablet before bedtime. CHANGE how you take these medications      apixaban 5 MG Tabs tablet  Commonly known as: ELIQUIS  Take 1 tablet by mouth in the morning and 1 tablet before bedtime. Start taking on: July 25, 2022  What changed: These instructions start on July 25, 2022. If you are unsure what to do until then, ask your doctor or other care provider. metoprolol tartrate 25 MG tablet  Commonly known as: LOPRESSOR  Take 0.5 tablets by mouth in the morning and 0.5 tablets before bedtime.   What changed: how much to take            CONTINUE taking these medications      atorvastatin 40 MG tablet  Commonly known as: LIPITOR     CENTRUM ADULTS PO     escitalopram 10 MG tablet  Commonly known as: LEXAPRO     niacin 1000 MG extended release tablet  Commonly known as: Lidia Novoa               Where to Get Your Medications        These medications were sent to [address] [facility] [address], [address] [place] [address] Drive  [address] [place] [address], [address] [place] [address] Str. [address]      Phone: [phone]   metoprolol tartrate 25 MG tablet  pantoprazole 40 MG tablet  sucralfate 1 GM tablet       Information about where to get these medications is not yet available    Ask your nurse or doctor about these medications  apixaban 5 MG Tabs tablet         No discharge procedures on file. Time Spent on discharge is  20 mins in patient examination, evaluation, counseling as well as medication reconciliation, prescriptions for required medications, discharge plan and follow up. Electronically signed by   Kd Scott DO  7/21/2022  12:09 PM      Thank you Dr. Sherron Acevedo MD for the opportunity to be involved in this patient's care.

## 2022-07-21 NOTE — PROGRESS NOTES
[facility]  Office: [address], DO, Anup Mirza, DO, Lisa Lugo, DO, Fabi Lara Blood, DO, Missy Chakraborty MD, Mikey Corona MD, Agapito Monk MD, Aiyana Willis MD,  Scott Escalera MD, Alissa Tipton MD, Lv Evans, DO, Derian Moreno MD,  Abe Mohs, MD, Joanne Cordoba MD, Akil Daly, DO, Faith Forte MD, Wood Santizo MD, Josie Romo MD, Boyds Officer, DO, Shalonda Mascorro MD, Anum Olguin MD, Laura Monroe, CNP,  Raphael Hill, CNP, Gearline Precise, CNP, Joi Cullen, CNP, Caitlin Gonzales PA-C, Aron Greene, [facility], Deyvi Johnson, CNP, Jany Louise, CNP, Liza Ignacio, CNP, Ellie Underwood, CNP, Yohan Bonds, CNS, Katharine Anguiano, [facility], Edward Wolf, CNP, Eriberto Bone, CNP, Ana Wallace, CNP, Elizabeth Calderón, CNP           [facility]    Progress Note    7/21/2022    12:06 PM    Name:   Jakub Francis  MRN:     [id number]     Acct:      [de-identified]   Room:   [address] [place] [address] Day:  4  Admit Date:  7/17/2022  8:11 PM    PCP:   Nedra Nava MD  Code Status:  DNR-CCA    Subjective:     C/C:   Chief Complaint   Patient presents with    Fatigue     Decreased appetite starting Tuesday     Diarrhea     Patient seen in follow-up for fatigue, diarrhea secondary to upper GI bleed secondary to duodenal ulcer. Patient states \"I feel much better\"    Interval History Status: improved. Patient is doing well overall. His hemoglobin earlier this morning was slightly low however repeat labs show that it is indeed stable. He has not had any significant drop over the last couple of days. He is tolerating a diet and has been transitioned over to Protonix oral.  At this point in time he has been instructed to restart his Eliquis next Monday. Given his stable hemoglobin and vitals he can be discharged home with further work-up/follow-up as an outpatient.   The patient is eager for discharge and denies any questions or concerns at this point in time. Brief History: This is a very pleasant 81-year-old male who presented to hospital with fatigue and was found to have symptomatic anemia secondary to upper GI bleed. Review of Systems:     Constitutional:  negative for chills, fevers, sweats  Respiratory:  negative for cough, dyspnea on exertion, shortness of breath, wheezing  Cardiovascular:  negative for chest pain, chest pressure/discomfort, lower extremity edema, palpitations  Gastrointestinal:  negative for abdominal pain, constipation, diarrhea, nausea, vomiting  Neurological:  negative for dizziness, headache    Medications: Allergies:  No Known Allergies    Current Meds:   Scheduled Meds:    pantoprazole  40 mg Oral BID AC    metoprolol tartrate  12.5 mg Oral BID    sucralfate  1 g Oral 4 times per day    ARIPiprazole  2 mg Oral Daily    [Held by provider] apixaban  5 mg Oral BID    atorvastatin  40 mg Oral Daily    escitalopram  10 mg Oral Daily    sodium chloride flush  5-40 mL IntraVENous 2 times per day     Continuous Infusions:    sodium chloride      sodium chloride       PRN Meds: sodium chloride, sodium chloride flush, sodium chloride, ondansetron **OR** ondansetron, acetaminophen **OR** acetaminophen    Data:     Past Medical History:   has a past medical history of Heart attack ([facility] [address] High cholesterol, Hypertension, and Stroke ([facility] [address] Social History:   reports that he has never smoked. He has never used smokeless tobacco. He reports that he does not currently use alcohol. He reports that he does not use drugs.      Family History:   Family History   Problem Relation Age of Onset    Colon Cancer Mother     Other Father        Vitals:  BP (!) 118/47   Pulse 65   Temp 97.5 °F (36.4 °C) (Oral)   Resp 20   Ht 5' 10\" (1.778 m)   Wt 206 lb 9.1 oz (93.7 kg)   SpO2 97%   BMI 29.64 kg/m²   Temp (24hrs), Av.9 °F (36.6 °C), Min:97.5 °F (36.4 °C), Max:98.1 °F (36.7 °C)    No results for input(s): POCGLU in the last 72 hours. I/O (24Hr): Intake/Output Summary (Last 24 hours) at 7/21/2022 1206  Last data filed at 7/21/2022 0925  Gross per 24 hour   Intake 2813.68 ml   Output --   Net 2813.68 ml       Labs:  Hematology:  Recent Labs     07/20/22  2339 07/21/22  0513 07/21/22  1049   HGB 7.4* 6.8* 7.3*   HCT 22.6* 21.1* 22.8*     Chemistry:  Recent Labs     07/20/22  0454      K 3.8   *   CO2 23   GLUCOSE 90   BUN 34*   CREATININE 1.36*   ANIONGAP 7*   LABGLOM 50*   GFRAA >60   CALCIUM 8.1*   No results for input(s): PROT, LABALBU, LABA1C, S8LOOAN, L6XJMBP, FT4, TSH, AST, ALT, LDH, GGT, ALKPHOS, LABGGT, BILITOT, BILIDIR, AMMONIA, AMYLASE, LIPASE, LACTATE, CHOL, HDL, LDLCHOLESTEROL, CHOLHDLRATIO, TRIG, VLDL, JWM39MK, PHENYTOIN, PHENYF, URICACID, POCGLU in the last 72 hours. 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 ., Similar finding if there is concern for free air, consider lateral decubitus x-rays of the abdomen.        Physical Examination:       General appearance:  alert, cooperative and no distress  Mental Status:  oriented to person, place and time and normal affect  Lungs:  clear to auscultation bilaterally, normal effort  Heart:  regular rate and rhythm, no murmur  Abdomen:  soft, nontender, nondistended, normal bowel sounds, no masses, hepatomegaly, splenomegaly  Extremities:  no edema, redness, tenderness in the calves  Skin:  no gross lesions, rashes, induration    Assessment:     Hospital Problems             Last Modified POA    * (Principal) GIB (gastrointestinal bleeding) 7/17/2022 Yes    Depression 7/18/2022 Yes    S/p TAVR (transcatheter aortic valve replacement), bioprosthetic 7/18/2022 Yes    Hyperlipidemia 7/18/2022 Yes    Long term (current) use of anticoagulants 7/18/2022 Yes    PAF (paroxysmal atrial fibrillation) ([address] 7/19/2022 Yes    Blood loss anemia 7/19/2022 Yes       Plan:     GI bleed/duodenal ulcer/AVM  Hemoglobin stable  Status post EGD and cautery  Transition to oral Protonix  Tolerating diet  Anemia of acute blood loss/upper GI bleed  Status post 3 units packed blood cells  Hemoglobin stable  Paroxysmal atrial fibrillation  Resume Eliquis on the 25th  Continue metoprolol 12.5 mg twice daily and discharge  Normal sinus rhythm  Aortic valve disease  Status post TAVR    Discharge home with visiting nurse services    Angela Ortiz DO  7/21/2022  12:06 PM

## 2022-07-21 NOTE — PROGRESS NOTES
Discharge instructions given to patient and grand daughters. All verbalize understanding. Pt taken out per wheelchair with belongings in stable condition to home.

## 2022-07-25 LAB
ABO/RH: NORMAL
ANTIBODY SCREEN: NEGATIVE
ARM BAND NUMBER: NORMAL
BLD PROD TYP BPU: NORMAL
BPU ID: NORMAL
CROSSMATCH RESULT: NORMAL
DISPENSE STATUS BLOOD BANK: NORMAL
EXPIRATION DATE: NORMAL
TRANSFUSION STATUS: NORMAL
UNIT DIVISION: 0

## 2022-08-02 ENCOUNTER — TELEPHONE (OUTPATIENT)
Dept: GASTROENTEROLOGY | Age: 87
End: 2022-08-02

## 2022-10-26 ENCOUNTER — HOSPITAL ENCOUNTER (OUTPATIENT)
Age: 87
Setting detail: SPECIMEN
Discharge: HOME | End: 2022-10-26

## 2022-10-26 ENCOUNTER — OFFICE VISIT (OUTPATIENT)
Dept: GASTROENTEROLOGY | Age: 87
End: 2022-10-26
Payer: MEDICARE

## 2022-10-26 VITALS
HEIGHT: 70 IN | BODY MASS INDEX: 28.26 KG/M2 | SYSTOLIC BLOOD PRESSURE: 114 MMHG | DIASTOLIC BLOOD PRESSURE: 54 MMHG | WEIGHT: 197.4 LBS

## 2022-10-26 DIAGNOSIS — K27.9 PEPTIC ULCER DISEASE: ICD-10-CM

## 2022-10-26 DIAGNOSIS — K27.9 PEPTIC ULCER DISEASE: Primary | ICD-10-CM

## 2022-10-26 LAB
ABSOLUTE EOS #: 0.18 K/UL (ref 0–0.44)
ABSOLUTE IMMATURE GRANULOCYTE: 0.04 K/UL (ref 0–0.3)
ABSOLUTE LYMPH #: 1.01 K/UL (ref 1.1–3.7)
ABSOLUTE MONO #: 0.57 K/UL (ref 0.1–1.2)
BASOPHILS # BLD: 1 % (ref 0–2)
BASOPHILS ABSOLUTE: 0.04 K/UL (ref 0–0.2)
EOSINOPHILS RELATIVE PERCENT: 4 % (ref 1–4)
HCT VFR BLD CALC: 28.5 % (ref 40.7–50.3)
HEMOGLOBIN: 7.9 G/DL (ref 13–17)
IMMATURE GRANULOCYTES: 1 %
IRON SATURATION: 5 % (ref 20–55)
IRON: 20 UG/DL (ref 59–158)
LYMPHOCYTES # BLD: 23 % (ref 24–43)
MCH RBC QN AUTO: 21.7 PG (ref 25.2–33.5)
MCHC RBC AUTO-ENTMCNC: 27.7 G/DL (ref 28.4–34.8)
MCV RBC AUTO: 78.3 FL (ref 82.6–102.9)
MONOCYTES # BLD: 13 % (ref 3–12)
MORPHOLOGY: ABNORMAL
MORPHOLOGY: ABNORMAL
NRBC AUTOMATED: 0 PER 100 WBC
PDW BLD-RTO: 17 % (ref 11.8–14.4)
PLATELET # BLD: 102 K/UL (ref 138–453)
PMV BLD AUTO: 10.9 FL (ref 8.1–13.5)
RBC # BLD: 3.64 M/UL (ref 4.21–5.77)
SEG NEUTROPHILS: 58 % (ref 36–65)
SEGMENTED NEUTROPHILS ABSOLUTE COUNT: 2.56 K/UL (ref 1.5–8.1)
TOTAL IRON BINDING CAPACITY: 408 UG/DL (ref 250–450)
UNSATURATED IRON BINDING CAPACITY: 388 UG/DL (ref 112–347)
WBC # BLD: 4.4 K/UL (ref 3.5–11.3)

## 2022-10-26 PROCEDURE — G8417 CALC BMI ABV UP PARAM F/U: HCPCS | Performed by: INTERNAL MEDICINE

## 2022-10-26 PROCEDURE — 1036F TOBACCO NON-USER: CPT | Performed by: INTERNAL MEDICINE

## 2022-10-26 PROCEDURE — G8427 DOCREV CUR MEDS BY ELIG CLIN: HCPCS | Performed by: INTERNAL MEDICINE

## 2022-10-26 PROCEDURE — 99213 OFFICE O/P EST LOW 20 MIN: CPT | Performed by: INTERNAL MEDICINE

## 2022-10-26 PROCEDURE — 1124F ACP DISCUSS-NO DSCNMKR DOCD: CPT | Performed by: INTERNAL MEDICINE

## 2022-10-26 PROCEDURE — G8484 FLU IMMUNIZE NO ADMIN: HCPCS | Performed by: INTERNAL MEDICINE

## 2022-10-26 RX ORDER — ARIPIPRAZOLE 5 MG/1
5 TABLET ORAL DAILY
COMMUNITY

## 2022-10-26 ASSESSMENT — ENCOUNTER SYMPTOMS
DIARRHEA: 0
TROUBLE SWALLOWING: 0
WHEEZING: 0
BLOOD IN STOOL: 0
SHORTNESS OF BREATH: 0
CONSTIPATION: 0
RESPIRATORY NEGATIVE: 1
NAUSEA: 0
VOMITING: 0
ABDOMINAL DISTENTION: 0
CHOKING: 0
COUGH: 0
VOICE CHANGE: 0
ANAL BLEEDING: 0
ABDOMINAL PAIN: 0
APNEA: 0
RECTAL PAIN: 0
GASTROINTESTINAL NEGATIVE: 1
SORE THROAT: 0
COLOR CHANGE: 0

## 2022-10-26 NOTE — PROGRESS NOTES
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 Peptic ulcer disease          HISTORY OF PRESENT ILLNESS: Karlos Galindo is a 81 y.o. male with a past history remarkable for history of CAD, hypertension, CVA CABG, history of TAVR, on Eliquis prior to admission, admitted with a hemoglobin of 4 g/dL, referred for evaluation of post upper GI bleed evaluation. Patient was treated endoscopically where he was identified to have a duodenal ulcer and abnormal AVMs over cauterized and ablated. Currently the patient is reporting normal bowel movements, denies any melena, hematochezia, bright red blood per rectum. Past Medical,Family, and Social History reviewed and does contribute to the patient presenting condition. Patient's PMH/PSH,SH,PSYCH Hx, MEDs, ALLERGIES, and ROS were all reviewed and updated in the appropriate sections.     PAST MEDICAL HISTORY:  Past Medical History:   Diagnosis Date    Heart attack ([address] 2003    High cholesterol     Hypertension     Stroke [facility]) 2019       Past Surgical History:   Procedure Laterality Date    ARTERIAL BYPASS SURGRY  2004    CARDIAC CATHETERIZATION  2003    CARDIAC VALVE REPLACEMENT  09/2019    ENDOSCOPY, COLON, DIAGNOSTIC      MOUTH BIOPSY  09/2019    tonsil     UPPER GASTROINTESTINAL ENDOSCOPY  07/18/2022    UPPER GASTROINTESTINAL ENDOSCOPY N/A 7/18/2022    EGD CONTROL HEMORRHAGE performed by Suhail Bejarano MD at [address] [place] [address] MEDICATIONS:    Current Outpatient Medications:     ARIPiprazole (ABILIFY) 5 MG tablet, Take 5 mg by mouth daily, Disp: , Rfl:     metoprolol tartrate (LOPRESSOR) 25 MG tablet, Take 0.5 tablets by mouth in the morning and 0.5 tablets before bedtime. (Patient taking differently: Take 50 mg by mouth 2 times daily), Disp: 60 tablet, Rfl: 3    sucralfate (CARAFATE) 1 GM tablet, Take 1 tablet by mouth in the morning and 1 tablet at noon and 1 tablet in the evening and 1 tablet before bedtime. , Disp: 120 tablet, Rfl: 0    pantoprazole (PROTONIX) 40 MG tablet, Take 1 tablet by mouth in the morning and 1 tablet in the evening. Take before meals. , Disp: 60 tablet, Rfl: 3    apixaban (ELIQUIS) 5 MG TABS tablet, Take 1 tablet by mouth in the morning and 1 tablet before bedtime. , Disp: 60 tablet, Rfl: 1    atorvastatin (LIPITOR) 40 MG tablet, Take 40 mg by mouth daily, Disp: , Rfl: 2    escitalopram (LEXAPRO) 10 MG tablet, Take 10 mg by mouth daily, Disp: , Rfl:     niacin (NIASPAN) 1000 MG extended release tablet, Take 1,000 mg by mouth daily, Disp: , Rfl: 3    Multiple Vitamins-Minerals (CENTRUM ADULTS PO), Take 1 tablet by mouth daily, Disp: , Rfl:     ALLERGIES:   No Known Allergies    FAMILY HISTORY:       Problem Relation Age of Onset    Colon Cancer Mother     Other Father          SOCIAL HISTORY:   Social History     Socioeconomic History    Marital status:      Spouse name:  Nan Aviles     Number of children: Not on file    Years of education: Not on file    Highest education level: Not on file   Occupational History    Not on file   Tobacco Use    Smoking status: Never    Smokeless tobacco: Never   Vaping Use    Vaping Use: Never used   Substance and Sexual Activity    Alcohol use: Not Currently    Drug use: Never    Sexual activity: Not Currently   Other Topics Concern    Not on file   Social History Narrative    Not on file     Social Determinants of Health     Financial Resource Strain: Not on file   Food Insecurity: Not on file   Transportation Needs: Not on file   Physical Activity: Not on file   Stress: Not on file   Social Connections: Not on file   Intimate Partner Violence: Not on file   Housing Stability: Not on file       REVIEW OF SYSTEMS: A 12-point review of systems was obtained and pertinent positives and negatives were listed below. REVIEW OF SYSTEMS:     Constitutional: No fever, no chills, no lethargy, no weakness. HEENT:  No headache, otalgia, itchy eyes, nasal discharge or sore throat. Cardiac:  No chest pain, dyspnea, orthopnea or PND. Chest:   No cough, phlegm or wheezing. Abdomen:      Detailed by MA   Neuro:  No focal weakness, abnormal movements or seizure like activity. Skin:   No rashes, no itching. :   No hematuria, no pyuria, no dysuria, no flank pain. Extremities:  No swelling or joint pains. ROS was otherwise negative    Review of Systems   Constitutional: Negative. Negative for appetite change, fatigue, fever and unexpected weight change. HENT: Negative. Negative for sore throat, trouble swallowing and voice change. Eyes:  Negative for visual disturbance. Respiratory: Negative. Negative for apnea, cough, choking, shortness of breath and wheezing. Cardiovascular: Negative. Negative for chest pain, palpitations and leg swelling. Gastrointestinal: Negative. Negative for abdominal distention, abdominal pain, anal bleeding, blood in stool, constipation, diarrhea, nausea, rectal pain and vomiting. Genitourinary: Negative. Negative for difficulty urinating. Skin:  Negative for color change and rash. Neurological: Negative. Negative for dizziness, seizures, weakness, light-headedness, numbness and headaches. Hematological:  Bruises/bleeds easily (blood thinner). Psychiatric/Behavioral: Negative. Negative for confusion and sleep disturbance. The patient is not nervous/anxious. PHYSICAL EXAMINATION: Vital signs reviewed per the nursing documentation. BP (!) 114/54 (Site: Right Upper Arm, Position: Sitting)   Ht 5' 10\" (1.778 m)   Wt 197 lb 6.4 oz (89.5 kg)   BMI 28.32 kg/m²   Body mass index is 28.32 kg/m². Physical Exam    Physical Exam   Constitutional: Patient is oriented to person, place, and time. Patient appears well-developed and well-nourished. HENT:   Head: Normocephalic and atraumatic. Eyes: Pupils are equal, round, and reactive to light. EOM are normal.   Neck: Normal range of motion. Neck supple. No JVD present. No tracheal deviation present. No thyromegaly present. Cardiovascular: Normal rate, regular rhythm, normal heart sounds and intact distal pulses. Pulmonary/Chest: Effort normal and breath sounds normal. No stridor. No respiratory distress. He has no wheezes. He has no rales. He exhibits no tenderness. Abdominal: Soft. Bowel sounds are normal. He exhibits no distension and no mass. There is no tenderness. There is no rebound and no guarding. No hernia. Musculoskeletal: Normal range of motion. Lymphadenopathy:    Patient has no cervical adenopathy. Neurological: Patient is alert and oriented to person, place, and time. Psychiatric: Patient has a normal mood and affect.  Patient behavior is normal.       LABORATORY DATA: Reviewed  Lab Results   Component Value Date    WBC 4.4 10/26/2022    HGB 7.9 (L) 10/26/2022    HCT 28.5 (L) 10/26/2022    MCV 78.3 (L) 10/26/2022     (L) 10/26/2022     07/20/2022    K 3.8 07/20/2022     (H) 07/20/2022    CO2 23 07/20/2022    BUN 34 (H) 07/20/2022    CREATININE 1.36 (H) 07/20/2022    LABALBU 3.3 (L) 07/18/2022    BILITOT 0.42 07/18/2022    ALKPHOS 63 07/18/2022    AST 26 07/18/2022    ALT 19 07/18/2022    INR 1.3 07/18/2022         Lab Results   Component Value Date    RBC 3.64 (L) 10/26/2022    HGB 7.9 (L) 10/26/2022    MCV 78.3 (L) 10/26/2022    MCH 21.7 (L) 10/26/2022    MCHC 27.7 (L) 10/26/2022    RDW 17.0 (H) 10/26/2022    MPV 10.9 10/26/2022    BASOPCT 1 10/26/2022    LYMPHSABS 1.01 (L) 10/26/2022    MONOSABS 0.57 10/26/2022    NEUTROABS 2.56 10/26/2022    EOSABS 0.18 10/26/2022    BASOSABS 0.04 10/26/2022         DIAGNOSTIC TESTING:     No results found. IMPRESSION: Denise Velázquez is a 81 y.o. male with a past history remarkable for history of CAD, hypertension, CVA CABG, history of TAVR, on Eliquis prior to admission, admitted with a hemoglobin of 4 g/dL, referred for evaluation of post upper GI bleed evaluation. Patient was treated endoscopically where he was identified to have a duodenal ulcer and abnormal AVMs over cauterized and ablated. Currently the patient is reporting normal bowel movements, denies any melena, hematochezia, bright red blood per rectum. Assessment  1. Peptic ulcer disease        Crow Verdugo was seen today for follow-up. Diagnoses and all orders for this visit:    Peptic ulcer disease and duodenal AVMs status post endoscopic therapy in July. Most recent hemoglobin was 7.3 g/dL. The patient denies any melena, hematochezia, or overcorrected. Remains on PPI therapy 40 mg twice daily. Denies any active bleeding at this time. Patient is continuing Eliquis 5 mg twice daily. We will repeat hemoglobin to ensure upward trend. -     CBC with Auto Differential; Future  -     Iron and TIBC; Future  -     Basic Metabolic Panel; Future     Patient advised to contact office should he experience any further episodes of symptomatic anemia or observable blood loss. RTC: 3 months. Additional comments: Thank you for allowing me to participate in the care of Mr. Hollie Lamb. For any further questions please do not hesitate to contact me. I have reviewed and agree with the ROS entered by the MA/LPN from today's encounter documented in a separate note.         Nayana Oconnor MD, MPH   Board Certified in Gastroenterology  Board Certified in [address] [place] [address] #: [phone]    this note is created with the assistance of a speech recognition program.  While intending to generate a document that actually reflects the content of the visit, the document can still have some errors including those of syntax and sound a like substitutions which may escape proof reading. It such instances, actual meaning can be extrapolated by contextual diversion.

## 2023-06-13 ENCOUNTER — APPOINTMENT (OUTPATIENT)
Dept: CT IMAGING | Age: 88
DRG: 640 | End: 2023-06-13
Payer: MEDICARE

## 2023-06-13 ENCOUNTER — HOSPITAL ENCOUNTER (INPATIENT)
Age: 88
LOS: 6 days | Discharge: NURSING, REHAB OR LONG-TERM CARE | DRG: 640 | End: 2023-06-19
Attending: EMERGENCY MEDICINE | Admitting: STUDENT IN AN ORGANIZED HEALTH CARE EDUCATION/TRAINING PROGRAM
Payer: MEDICARE

## 2023-06-13 ENCOUNTER — APPOINTMENT (OUTPATIENT)
Dept: GENERAL RADIOLOGY | Age: 88
DRG: 640 | End: 2023-06-13
Payer: MEDICARE

## 2023-06-13 DIAGNOSIS — R53.1 GENERALIZED WEAKNESS: Primary | ICD-10-CM

## 2023-06-13 DIAGNOSIS — E86.0 DEHYDRATION: ICD-10-CM

## 2023-06-13 DIAGNOSIS — R77.8 ELEVATED TROPONIN: ICD-10-CM

## 2023-06-13 DIAGNOSIS — N17.9 ACUTE RENAL FAILURE, UNSPECIFIED ACUTE RENAL FAILURE TYPE (HCC): ICD-10-CM

## 2023-06-13 LAB
ALBUMIN SERPL-MCNC: 3.7 G/DL (ref 3.5–5.2)
ALBUMIN/GLOB SERPL: 1.7 {RATIO} (ref 1–2.5)
ALP SERPL-CCNC: 118 U/L (ref 40–129)
ALT SERPL-CCNC: 50 U/L (ref 5–41)
AMORPH SED URNS QL MICRO: ABNORMAL
ANION GAP SERPL CALCULATED.3IONS-SCNC: 11 MMOL/L (ref 9–17)
AST SERPL-CCNC: 63 U/L
BACTERIA URNS QL MICRO: ABNORMAL
BASOPHILS # BLD: 0 K/UL (ref 0–0.2)
BASOPHILS NFR BLD: 0 % (ref 0–2)
BILIRUB SERPL-MCNC: 0.6 MG/DL (ref 0.3–1.2)
BILIRUB UR QL STRIP: NEGATIVE
BUN SERPL-MCNC: 44 MG/DL (ref 8–23)
CALCIUM SERPL-MCNC: 10.9 MG/DL (ref 8.6–10.4)
CASTS #/AREA URNS LPF: ABNORMAL /LPF
CASTS #/AREA URNS LPF: ABNORMAL /LPF
CHARACTER UR: ABNORMAL
CHLORIDE SERPL-SCNC: 103 MMOL/L (ref 98–107)
CLARITY UR: CLEAR
CO2 SERPL-SCNC: 25 MMOL/L (ref 20–31)
COLOR UR: YELLOW
CREAT SERPL-MCNC: 2.12 MG/DL (ref 0.7–1.2)
CRYSTALS URNS MICRO: ABNORMAL /HPF
EOSINOPHIL # BLD: 0.1 K/UL (ref 0–0.4)
EOSINOPHILS RELATIVE PERCENT: 1 % (ref 1–4)
EPI CELLS #/AREA URNS HPF: ABNORMAL /HPF (ref 0–5)
ERYTHROCYTE [DISTWIDTH] IN BLOOD BY AUTOMATED COUNT: 18 % (ref 12.5–15.4)
GFR SERPL CREATININE-BSD FRML MDRD: 30 ML/MIN/1.73M2
GLUCOSE SERPL-MCNC: 124 MG/DL (ref 70–99)
GLUCOSE UR STRIP-MCNC: NEGATIVE MG/DL
HCT VFR BLD AUTO: 30 % (ref 41–53)
HGB BLD-MCNC: 9.8 G/DL (ref 13.5–17.5)
HGB UR QL STRIP.AUTO: ABNORMAL
INR PPP: 1.3
KETONES UR STRIP-MCNC: NEGATIVE MG/DL
LEUKOCYTE ESTERASE UR QL STRIP: NEGATIVE
LYMPHOCYTES # BLD: 23 % (ref 24–44)
LYMPHOCYTES NFR BLD: 2.1 K/UL (ref 1–4.8)
MCH RBC QN AUTO: 29.1 PG (ref 26–34)
MCHC RBC AUTO-ENTMCNC: 32.6 G/DL (ref 31–37)
MCV RBC AUTO: 89.1 FL (ref 80–100)
MONOCYTES NFR BLD: 1.2 K/UL (ref 0.1–1.2)
MONOCYTES NFR BLD: 13 % (ref 2–11)
MUCOUS THREADS URNS QL MICRO: ABNORMAL
NEUTROPHILS NFR BLD: 63 % (ref 36–66)
NEUTS SEG NFR BLD: 5.9 K/UL (ref 1.8–7.7)
NITRITE UR QL STRIP: NEGATIVE
PH UR STRIP: 5.5 [PH] (ref 5–8)
PLATELET # BLD AUTO: 104 K/UL (ref 140–450)
PMV BLD AUTO: 7.3 FL (ref 6–12)
POTASSIUM SERPL-SCNC: 4.3 MMOL/L (ref 3.7–5.3)
PROT SERPL-MCNC: 5.9 G/DL (ref 6.4–8.3)
PROT UR STRIP-MCNC: ABNORMAL MG/DL
PROTHROMBIN TIME: 13.9 SEC (ref 9.4–12.6)
RBC # BLD AUTO: 3.37 M/UL (ref 4.5–5.9)
RBC #/AREA URNS HPF: ABNORMAL /HPF (ref 0–2)
SODIUM SERPL-SCNC: 139 MMOL/L (ref 135–144)
SP GR UR STRIP: 1.02 (ref 1–1.03)
TROPONIN I SERPL HS-MCNC: 104 NG/L (ref 0–22)
TROPONIN I SERPL HS-MCNC: 112 NG/L (ref 0–22)
UROBILINOGEN UR STRIP-ACNC: NORMAL
WBC #/AREA URNS HPF: ABNORMAL /HPF (ref 0–5)
WBC OTHER # BLD: 9.3 K/UL (ref 3.5–11)

## 2023-06-13 PROCEDURE — 2580000003 HC RX 258: Performed by: EMERGENCY MEDICINE

## 2023-06-13 PROCEDURE — 99285 EMERGENCY DEPT VISIT HI MDM: CPT

## 2023-06-13 PROCEDURE — 84484 ASSAY OF TROPONIN QUANT: CPT

## 2023-06-13 PROCEDURE — 81001 URINALYSIS AUTO W/SCOPE: CPT

## 2023-06-13 PROCEDURE — 99222 1ST HOSP IP/OBS MODERATE 55: CPT | Performed by: HOSPITALIST

## 2023-06-13 PROCEDURE — 70450 CT HEAD/BRAIN W/O DYE: CPT

## 2023-06-13 PROCEDURE — 2580000003 HC RX 258: Performed by: HOSPITALIST

## 2023-06-13 PROCEDURE — 80053 COMPREHEN METABOLIC PANEL: CPT

## 2023-06-13 PROCEDURE — 93005 ELECTROCARDIOGRAM TRACING: CPT | Performed by: EMERGENCY MEDICINE

## 2023-06-13 PROCEDURE — 6370000000 HC RX 637 (ALT 250 FOR IP): Performed by: HOSPITALIST

## 2023-06-13 PROCEDURE — 36415 COLL VENOUS BLD VENIPUNCTURE: CPT

## 2023-06-13 PROCEDURE — 85610 PROTHROMBIN TIME: CPT

## 2023-06-13 PROCEDURE — 85027 COMPLETE CBC AUTOMATED: CPT

## 2023-06-13 PROCEDURE — 1210000000 HC MED SURG R&B

## 2023-06-13 PROCEDURE — 71045 X-RAY EXAM CHEST 1 VIEW: CPT

## 2023-06-13 RX ORDER — PANTOPRAZOLE SODIUM 40 MG/1
40 TABLET, DELAYED RELEASE ORAL
Status: DISCONTINUED | OUTPATIENT
Start: 2023-06-13 | End: 2023-06-16

## 2023-06-13 RX ORDER — METOPROLOL TARTRATE 50 MG/1
50 TABLET, FILM COATED ORAL 2 TIMES DAILY
Status: DISCONTINUED | OUTPATIENT
Start: 2023-06-13 | End: 2023-06-19 | Stop reason: HOSPADM

## 2023-06-13 RX ORDER — M-VIT,TX,IRON,MINS/CALC/FOLIC 27MG-0.4MG
1 TABLET ORAL DAILY
Status: DISCONTINUED | OUTPATIENT
Start: 2023-06-13 | End: 2023-06-19 | Stop reason: HOSPADM

## 2023-06-13 RX ORDER — SUCRALFATE 1 G/1
1 TABLET ORAL 4 TIMES DAILY
Status: DISCONTINUED | OUTPATIENT
Start: 2023-06-13 | End: 2023-06-19 | Stop reason: HOSPADM

## 2023-06-13 RX ORDER — ARIPIPRAZOLE 5 MG/1
5 TABLET ORAL DAILY
Status: DISCONTINUED | OUTPATIENT
Start: 2023-06-13 | End: 2023-06-19 | Stop reason: HOSPADM

## 2023-06-13 RX ORDER — ATORVASTATIN CALCIUM 40 MG/1
40 TABLET, FILM COATED ORAL DAILY
Status: DISCONTINUED | OUTPATIENT
Start: 2023-06-13 | End: 2023-06-19 | Stop reason: HOSPADM

## 2023-06-13 RX ORDER — ONDANSETRON 2 MG/ML
4 INJECTION INTRAMUSCULAR; INTRAVENOUS EVERY 6 HOURS PRN
Status: DISCONTINUED | OUTPATIENT
Start: 2023-06-13 | End: 2023-06-19 | Stop reason: HOSPADM

## 2023-06-13 RX ORDER — ACETAMINOPHEN 325 MG/1
650 TABLET ORAL EVERY 6 HOURS PRN
Status: DISCONTINUED | OUTPATIENT
Start: 2023-06-13 | End: 2023-06-19 | Stop reason: HOSPADM

## 2023-06-13 RX ORDER — SODIUM CHLORIDE 9 MG/ML
INJECTION, SOLUTION INTRAVENOUS PRN
Status: DISCONTINUED | OUTPATIENT
Start: 2023-06-13 | End: 2023-06-19 | Stop reason: HOSPADM

## 2023-06-13 RX ORDER — ONDANSETRON 4 MG/1
4 TABLET, ORALLY DISINTEGRATING ORAL EVERY 8 HOURS PRN
Status: DISCONTINUED | OUTPATIENT
Start: 2023-06-13 | End: 2023-06-19 | Stop reason: HOSPADM

## 2023-06-13 RX ORDER — SODIUM CHLORIDE 0.9 % (FLUSH) 0.9 %
3 SYRINGE (ML) INJECTION EVERY 8 HOURS
Status: DISCONTINUED | OUTPATIENT
Start: 2023-06-13 | End: 2023-06-19 | Stop reason: HOSPADM

## 2023-06-13 RX ORDER — SODIUM CHLORIDE 0.9 % (FLUSH) 0.9 %
5-40 SYRINGE (ML) INJECTION PRN
Status: DISCONTINUED | OUTPATIENT
Start: 2023-06-13 | End: 2023-06-19 | Stop reason: HOSPADM

## 2023-06-13 RX ORDER — NIACIN 500 MG/1
1000 TABLET, EXTENDED RELEASE ORAL DAILY
Status: DISCONTINUED | OUTPATIENT
Start: 2023-06-13 | End: 2023-06-19 | Stop reason: HOSPADM

## 2023-06-13 RX ORDER — SODIUM CHLORIDE 0.9 % (FLUSH) 0.9 %
5-40 SYRINGE (ML) INJECTION EVERY 12 HOURS SCHEDULED
Status: DISCONTINUED | OUTPATIENT
Start: 2023-06-13 | End: 2023-06-19 | Stop reason: HOSPADM

## 2023-06-13 RX ORDER — ESCITALOPRAM OXALATE 10 MG/1
10 TABLET ORAL DAILY
Status: DISCONTINUED | OUTPATIENT
Start: 2023-06-13 | End: 2023-06-19 | Stop reason: HOSPADM

## 2023-06-13 RX ORDER — SODIUM CHLORIDE 9 MG/ML
INJECTION, SOLUTION INTRAVENOUS CONTINUOUS
Status: DISCONTINUED | OUTPATIENT
Start: 2023-06-13 | End: 2023-06-16

## 2023-06-13 RX ORDER — ACETAMINOPHEN 650 MG/1
650 SUPPOSITORY RECTAL EVERY 6 HOURS PRN
Status: DISCONTINUED | OUTPATIENT
Start: 2023-06-13 | End: 2023-06-19 | Stop reason: HOSPADM

## 2023-06-13 RX ADMIN — METOPROLOL TARTRATE 50 MG: 50 TABLET, FILM COATED ORAL at 20:36

## 2023-06-13 RX ADMIN — SUCRALFATE 1 G: 1 TABLET ORAL at 20:36

## 2023-06-13 RX ADMIN — SODIUM CHLORIDE, PRESERVATIVE FREE 3 ML: 5 INJECTION INTRAVENOUS at 11:23

## 2023-06-13 RX ADMIN — PANTOPRAZOLE SODIUM 40 MG: 40 TABLET, DELAYED RELEASE ORAL at 17:44

## 2023-06-13 RX ADMIN — ATORVASTATIN CALCIUM 40 MG: 40 TABLET, FILM COATED ORAL at 17:47

## 2023-06-13 RX ADMIN — SODIUM CHLORIDE: 9 INJECTION, SOLUTION INTRAVENOUS at 16:39

## 2023-06-13 RX ADMIN — APIXABAN 2.5 MG: 2.5 TABLET, FILM COATED ORAL at 20:36

## 2023-06-13 ASSESSMENT — HEART SCORE: ECG: 1

## 2023-06-14 LAB
ANION GAP SERPL CALCULATED.3IONS-SCNC: 10 MMOL/L (ref 9–17)
BUN SERPL-MCNC: 41 MG/DL (ref 8–23)
CALCIUM SERPL-MCNC: 10.3 MG/DL (ref 8.6–10.4)
CHLORIDE SERPL-SCNC: 105 MMOL/L (ref 98–107)
CO2 SERPL-SCNC: 24 MMOL/L (ref 20–31)
CREAT SERPL-MCNC: 1.81 MG/DL (ref 0.7–1.2)
EKG ATRIAL RATE: 59 BPM
EKG P AXIS: 56 DEGREES
EKG P-R INTERVAL: 146 MS
EKG Q-T INTERVAL: 480 MS
EKG QRS DURATION: 156 MS
EKG QTC CALCULATION (BAZETT): 475 MS
EKG R AXIS: -23 DEGREES
EKG T AXIS: 58 DEGREES
EKG VENTRICULAR RATE: 59 BPM
GFR SERPL CREATININE-BSD FRML MDRD: 36 ML/MIN/1.73M2
GLUCOSE SERPL-MCNC: 79 MG/DL (ref 70–99)
POTASSIUM SERPL-SCNC: 4.1 MMOL/L (ref 3.7–5.3)
SODIUM SERPL-SCNC: 139 MMOL/L (ref 135–144)

## 2023-06-14 PROCEDURE — 6370000000 HC RX 637 (ALT 250 FOR IP): Performed by: HOSPITALIST

## 2023-06-14 PROCEDURE — 97116 GAIT TRAINING THERAPY: CPT

## 2023-06-14 PROCEDURE — 97166 OT EVAL MOD COMPLEX 45 MIN: CPT

## 2023-06-14 PROCEDURE — 97162 PT EVAL MOD COMPLEX 30 MIN: CPT

## 2023-06-14 PROCEDURE — 2500000003 HC RX 250 WO HCPCS: Performed by: HOSPITALIST

## 2023-06-14 PROCEDURE — 1210000000 HC MED SURG R&B

## 2023-06-14 PROCEDURE — 97535 SELF CARE MNGMENT TRAINING: CPT

## 2023-06-14 PROCEDURE — 80048 BASIC METABOLIC PNL TOTAL CA: CPT

## 2023-06-14 PROCEDURE — 2580000003 HC RX 258: Performed by: HOSPITALIST

## 2023-06-14 PROCEDURE — 36415 COLL VENOUS BLD VENIPUNCTURE: CPT

## 2023-06-14 PROCEDURE — 2580000003 HC RX 258: Performed by: EMERGENCY MEDICINE

## 2023-06-14 PROCEDURE — 99232 SBSQ HOSP IP/OBS MODERATE 35: CPT | Performed by: HOSPITALIST

## 2023-06-14 RX ADMIN — PANTOPRAZOLE SODIUM 40 MG: 40 TABLET, DELAYED RELEASE ORAL at 08:02

## 2023-06-14 RX ADMIN — SUCRALFATE 1 G: 1 TABLET ORAL at 13:40

## 2023-06-14 RX ADMIN — ANTI-FUNGAL POWDER MICONAZOLE NITRATE TALC FREE: 1.42 POWDER TOPICAL at 21:30

## 2023-06-14 RX ADMIN — SODIUM CHLORIDE, PRESERVATIVE FREE 3 ML: 5 INJECTION INTRAVENOUS at 21:25

## 2023-06-14 RX ADMIN — SUCRALFATE 1 G: 1 TABLET ORAL at 21:25

## 2023-06-14 RX ADMIN — SODIUM CHLORIDE, PRESERVATIVE FREE 10 ML: 5 INJECTION INTRAVENOUS at 09:55

## 2023-06-14 RX ADMIN — METOPROLOL TARTRATE 50 MG: 50 TABLET, FILM COATED ORAL at 21:26

## 2023-06-14 RX ADMIN — APIXABAN 2.5 MG: 2.5 TABLET, FILM COATED ORAL at 21:25

## 2023-06-14 RX ADMIN — APIXABAN 2.5 MG: 2.5 TABLET, FILM COATED ORAL at 08:03

## 2023-06-14 RX ADMIN — ESCITALOPRAM OXALATE 10 MG: 10 TABLET ORAL at 08:02

## 2023-06-14 RX ADMIN — ARIPIPRAZOLE 5 MG: 5 TABLET ORAL at 08:02

## 2023-06-14 RX ADMIN — PANTOPRAZOLE SODIUM 40 MG: 40 TABLET, DELAYED RELEASE ORAL at 16:36

## 2023-06-14 RX ADMIN — SUCRALFATE 1 G: 1 TABLET ORAL at 16:36

## 2023-06-14 RX ADMIN — ATORVASTATIN CALCIUM 40 MG: 40 TABLET, FILM COATED ORAL at 21:25

## 2023-06-14 RX ADMIN — METOPROLOL TARTRATE 50 MG: 50 TABLET, FILM COATED ORAL at 08:02

## 2023-06-14 RX ADMIN — Medication 1 TABLET: at 08:02

## 2023-06-14 RX ADMIN — SODIUM CHLORIDE, PRESERVATIVE FREE 10 ML: 5 INJECTION INTRAVENOUS at 21:25

## 2023-06-14 RX ADMIN — SUCRALFATE 1 G: 1 TABLET ORAL at 08:02

## 2023-06-15 ENCOUNTER — APPOINTMENT (OUTPATIENT)
Dept: GENERAL RADIOLOGY | Age: 88
DRG: 640 | End: 2023-06-15
Payer: MEDICARE

## 2023-06-15 LAB
ALBUMIN SERPL-MCNC: 3 G/DL (ref 3.5–5.2)
ALBUMIN/GLOB SERPL: 1.4 {RATIO} (ref 1–2.5)
ALP SERPL-CCNC: 104 U/L (ref 40–129)
ALT SERPL-CCNC: 36 U/L (ref 5–41)
ANION GAP SERPL CALCULATED.3IONS-SCNC: 9 MMOL/L (ref 9–17)
AST SERPL-CCNC: 53 U/L
BASOPHILS # BLD: 0 K/UL (ref 0–0.2)
BASOPHILS NFR BLD: 0 % (ref 0–2)
BILIRUB SERPL-MCNC: 0.6 MG/DL (ref 0.3–1.2)
BUN SERPL-MCNC: 43 MG/DL (ref 8–23)
CALCIUM SERPL-MCNC: 10.1 MG/DL (ref 8.6–10.4)
CHLORIDE SERPL-SCNC: 105 MMOL/L (ref 98–107)
CO2 SERPL-SCNC: 23 MMOL/L (ref 20–31)
CREAT SERPL-MCNC: 1.53 MG/DL (ref 0.7–1.2)
EOSINOPHIL # BLD: 0.2 K/UL (ref 0–0.4)
EOSINOPHILS RELATIVE PERCENT: 3 % (ref 1–4)
ERYTHROCYTE [DISTWIDTH] IN BLOOD BY AUTOMATED COUNT: 17.5 % (ref 12.5–15.4)
GFR SERPL CREATININE-BSD FRML MDRD: 44 ML/MIN/1.73M2
GLUCOSE BLD-MCNC: 99 MG/DL (ref 75–110)
GLUCOSE SERPL-MCNC: 90 MG/DL (ref 70–99)
HCT VFR BLD AUTO: 27.1 % (ref 41–53)
HGB BLD-MCNC: 9 G/DL (ref 13.5–17.5)
LYMPHOCYTES # BLD: 24 % (ref 24–44)
LYMPHOCYTES NFR BLD: 2.2 K/UL (ref 1–4.8)
MCH RBC QN AUTO: 29.5 PG (ref 26–34)
MCHC RBC AUTO-ENTMCNC: 33.4 G/DL (ref 31–37)
MCV RBC AUTO: 88.5 FL (ref 80–100)
MONOCYTES NFR BLD: 1.1 K/UL (ref 0.1–1.2)
MONOCYTES NFR BLD: 12 % (ref 2–11)
NEUTROPHILS NFR BLD: 61 % (ref 36–66)
NEUTS SEG NFR BLD: 5.7 K/UL (ref 1.8–7.7)
PLATELET # BLD AUTO: 95 K/UL (ref 140–450)
PMV BLD AUTO: 7.2 FL (ref 6–12)
POTASSIUM SERPL-SCNC: 4 MMOL/L (ref 3.7–5.3)
PROT SERPL-MCNC: 5.1 G/DL (ref 6.4–8.3)
RBC # BLD AUTO: 3.06 M/UL (ref 4.5–5.9)
SODIUM SERPL-SCNC: 137 MMOL/L (ref 135–144)
WBC OTHER # BLD: 9.2 K/UL (ref 3.5–11)

## 2023-06-15 PROCEDURE — 2580000003 HC RX 258: Performed by: HOSPITALIST

## 2023-06-15 PROCEDURE — 99232 SBSQ HOSP IP/OBS MODERATE 35: CPT | Performed by: STUDENT IN AN ORGANIZED HEALTH CARE EDUCATION/TRAINING PROGRAM

## 2023-06-15 PROCEDURE — 1210000000 HC MED SURG R&B

## 2023-06-15 PROCEDURE — 97110 THERAPEUTIC EXERCISES: CPT

## 2023-06-15 PROCEDURE — 80053 COMPREHEN METABOLIC PANEL: CPT

## 2023-06-15 PROCEDURE — 85027 COMPLETE CBC AUTOMATED: CPT

## 2023-06-15 PROCEDURE — 82947 ASSAY GLUCOSE BLOOD QUANT: CPT

## 2023-06-15 PROCEDURE — 6360000002 HC RX W HCPCS: Performed by: HOSPITALIST

## 2023-06-15 PROCEDURE — 6370000000 HC RX 637 (ALT 250 FOR IP): Performed by: HOSPITALIST

## 2023-06-15 PROCEDURE — 36415 COLL VENOUS BLD VENIPUNCTURE: CPT

## 2023-06-15 PROCEDURE — 71045 X-RAY EXAM CHEST 1 VIEW: CPT

## 2023-06-15 PROCEDURE — 97116 GAIT TRAINING THERAPY: CPT

## 2023-06-15 RX ADMIN — ATORVASTATIN CALCIUM 40 MG: 40 TABLET, FILM COATED ORAL at 20:53

## 2023-06-15 RX ADMIN — SODIUM CHLORIDE: 9 INJECTION, SOLUTION INTRAVENOUS at 12:36

## 2023-06-15 RX ADMIN — CEFTRIAXONE SODIUM 1000 MG: 1 INJECTION, POWDER, FOR SOLUTION INTRAMUSCULAR; INTRAVENOUS at 16:45

## 2023-06-15 RX ADMIN — METOPROLOL TARTRATE 50 MG: 50 TABLET, FILM COATED ORAL at 08:43

## 2023-06-15 RX ADMIN — ANTI-FUNGAL POWDER MICONAZOLE NITRATE TALC FREE: 1.42 POWDER TOPICAL at 08:50

## 2023-06-15 RX ADMIN — ARIPIPRAZOLE 5 MG: 5 TABLET ORAL at 08:43

## 2023-06-15 RX ADMIN — Medication 1 TABLET: at 08:44

## 2023-06-15 RX ADMIN — SUCRALFATE 1 G: 1 TABLET ORAL at 08:43

## 2023-06-15 RX ADMIN — ESCITALOPRAM OXALATE 10 MG: 10 TABLET ORAL at 08:43

## 2023-06-15 RX ADMIN — PANTOPRAZOLE SODIUM 40 MG: 40 TABLET, DELAYED RELEASE ORAL at 05:49

## 2023-06-15 RX ADMIN — APIXABAN 2.5 MG: 2.5 TABLET, FILM COATED ORAL at 08:43

## 2023-06-15 RX ADMIN — APIXABAN 2.5 MG: 2.5 TABLET, FILM COATED ORAL at 20:53

## 2023-06-15 RX ADMIN — ACETAMINOPHEN 650 MG: 325 TABLET ORAL at 20:53

## 2023-06-15 RX ADMIN — AZITHROMYCIN MONOHYDRATE 500 MG: 500 INJECTION, POWDER, LYOPHILIZED, FOR SOLUTION INTRAVENOUS at 17:38

## 2023-06-15 RX ADMIN — ANTI-FUNGAL POWDER MICONAZOLE NITRATE TALC FREE: 1.42 POWDER TOPICAL at 20:55

## 2023-06-16 ENCOUNTER — APPOINTMENT (OUTPATIENT)
Dept: NON INVASIVE DIAGNOSTICS | Age: 88
DRG: 640 | End: 2023-06-16
Payer: MEDICARE

## 2023-06-16 LAB
ANION GAP SERPL CALCULATED.3IONS-SCNC: 10 MMOL/L (ref 9–17)
BUN SERPL-MCNC: 45 MG/DL (ref 8–23)
CALCIUM SERPL-MCNC: 9.9 MG/DL (ref 8.6–10.4)
CHLORIDE SERPL-SCNC: 106 MMOL/L (ref 98–107)
CO2 SERPL-SCNC: 23 MMOL/L (ref 20–31)
CREAT SERPL-MCNC: 1.57 MG/DL (ref 0.7–1.2)
GFR SERPL CREATININE-BSD FRML MDRD: 42 ML/MIN/1.73M2
GLUCOSE SERPL-MCNC: 100 MG/DL (ref 70–99)
LV EF: 53 %
LVEF MODALITY: NORMAL
POTASSIUM SERPL-SCNC: 3.9 MMOL/L (ref 3.7–5.3)
SODIUM SERPL-SCNC: 139 MMOL/L (ref 135–144)

## 2023-06-16 PROCEDURE — 6360000002 HC RX W HCPCS: Performed by: HOSPITALIST

## 2023-06-16 PROCEDURE — 1210000000 HC MED SURG R&B

## 2023-06-16 PROCEDURE — 97535 SELF CARE MNGMENT TRAINING: CPT

## 2023-06-16 PROCEDURE — 2580000003 HC RX 258: Performed by: HOSPITALIST

## 2023-06-16 PROCEDURE — 92610 EVALUATE SWALLOWING FUNCTION: CPT

## 2023-06-16 PROCEDURE — 93306 TTE W/DOPPLER COMPLETE: CPT

## 2023-06-16 PROCEDURE — 97116 GAIT TRAINING THERAPY: CPT

## 2023-06-16 PROCEDURE — 99232 SBSQ HOSP IP/OBS MODERATE 35: CPT | Performed by: HOSPITALIST

## 2023-06-16 PROCEDURE — 92523 SPEECH SOUND LANG COMPREHEN: CPT

## 2023-06-16 PROCEDURE — A4216 STERILE WATER/SALINE, 10 ML: HCPCS | Performed by: HOSPITALIST

## 2023-06-16 PROCEDURE — C9113 INJ PANTOPRAZOLE SODIUM, VIA: HCPCS | Performed by: HOSPITALIST

## 2023-06-16 PROCEDURE — 36415 COLL VENOUS BLD VENIPUNCTURE: CPT

## 2023-06-16 PROCEDURE — 6370000000 HC RX 637 (ALT 250 FOR IP): Performed by: HOSPITALIST

## 2023-06-16 PROCEDURE — 80048 BASIC METABOLIC PNL TOTAL CA: CPT

## 2023-06-16 RX ADMIN — SODIUM CHLORIDE, PRESERVATIVE FREE 10 ML: 5 INJECTION INTRAVENOUS at 20:49

## 2023-06-16 RX ADMIN — ATORVASTATIN CALCIUM 40 MG: 40 TABLET, FILM COATED ORAL at 20:49

## 2023-06-16 RX ADMIN — APIXABAN 2.5 MG: 2.5 TABLET, FILM COATED ORAL at 20:49

## 2023-06-16 RX ADMIN — METOPROLOL TARTRATE 50 MG: 50 TABLET, FILM COATED ORAL at 08:18

## 2023-06-16 RX ADMIN — PANTOPRAZOLE SODIUM 40 MG: 40 TABLET, DELAYED RELEASE ORAL at 16:51

## 2023-06-16 RX ADMIN — Medication 1 TABLET: at 08:18

## 2023-06-16 RX ADMIN — ANTI-FUNGAL POWDER MICONAZOLE NITRATE TALC FREE: 1.42 POWDER TOPICAL at 20:49

## 2023-06-16 RX ADMIN — AZITHROMYCIN MONOHYDRATE 500 MG: 500 INJECTION, POWDER, LYOPHILIZED, FOR SOLUTION INTRAVENOUS at 16:11

## 2023-06-16 RX ADMIN — APIXABAN 2.5 MG: 2.5 TABLET, FILM COATED ORAL at 08:18

## 2023-06-16 RX ADMIN — ARIPIPRAZOLE 5 MG: 5 TABLET ORAL at 08:18

## 2023-06-16 RX ADMIN — ESCITALOPRAM OXALATE 10 MG: 10 TABLET ORAL at 08:18

## 2023-06-16 RX ADMIN — PANTOPRAZOLE SODIUM 40 MG: 40 TABLET, DELAYED RELEASE ORAL at 08:18

## 2023-06-16 RX ADMIN — ANTI-FUNGAL POWDER MICONAZOLE NITRATE TALC FREE: 1.42 POWDER TOPICAL at 16:08

## 2023-06-16 RX ADMIN — SODIUM CHLORIDE, PRESERVATIVE FREE 40 MG: 5 INJECTION INTRAVENOUS at 17:38

## 2023-06-16 RX ADMIN — CEFTRIAXONE SODIUM 1000 MG: 1 INJECTION, POWDER, FOR SOLUTION INTRAMUSCULAR; INTRAVENOUS at 15:27

## 2023-06-17 PROCEDURE — 2500000003 HC RX 250 WO HCPCS: Performed by: HOSPITALIST

## 2023-06-17 PROCEDURE — 6360000002 HC RX W HCPCS: Performed by: HOSPITALIST

## 2023-06-17 PROCEDURE — 97535 SELF CARE MNGMENT TRAINING: CPT

## 2023-06-17 PROCEDURE — 2580000003 HC RX 258: Performed by: HOSPITALIST

## 2023-06-17 PROCEDURE — 1210000000 HC MED SURG R&B

## 2023-06-17 PROCEDURE — 97116 GAIT TRAINING THERAPY: CPT

## 2023-06-17 PROCEDURE — 99232 SBSQ HOSP IP/OBS MODERATE 35: CPT | Performed by: STUDENT IN AN ORGANIZED HEALTH CARE EDUCATION/TRAINING PROGRAM

## 2023-06-17 PROCEDURE — 6370000000 HC RX 637 (ALT 250 FOR IP): Performed by: HOSPITALIST

## 2023-06-17 PROCEDURE — C9113 INJ PANTOPRAZOLE SODIUM, VIA: HCPCS | Performed by: HOSPITALIST

## 2023-06-17 PROCEDURE — 2580000003 HC RX 258: Performed by: STUDENT IN AN ORGANIZED HEALTH CARE EDUCATION/TRAINING PROGRAM

## 2023-06-17 PROCEDURE — 97530 THERAPEUTIC ACTIVITIES: CPT

## 2023-06-17 PROCEDURE — 6360000002 HC RX W HCPCS: Performed by: STUDENT IN AN ORGANIZED HEALTH CARE EDUCATION/TRAINING PROGRAM

## 2023-06-17 PROCEDURE — A4216 STERILE WATER/SALINE, 10 ML: HCPCS | Performed by: HOSPITALIST

## 2023-06-17 RX ADMIN — Medication 1 TABLET: at 08:59

## 2023-06-17 RX ADMIN — SUCRALFATE 1 G: 1 TABLET ORAL at 16:20

## 2023-06-17 RX ADMIN — APIXABAN 2.5 MG: 2.5 TABLET, FILM COATED ORAL at 09:01

## 2023-06-17 RX ADMIN — ARIPIPRAZOLE 5 MG: 5 TABLET ORAL at 08:59

## 2023-06-17 RX ADMIN — SUCRALFATE 1 G: 1 TABLET ORAL at 09:00

## 2023-06-17 RX ADMIN — SODIUM CHLORIDE, PRESERVATIVE FREE 40 MG: 5 INJECTION INTRAVENOUS at 06:38

## 2023-06-17 RX ADMIN — SODIUM CHLORIDE, PRESERVATIVE FREE 40 MG: 5 INJECTION INTRAVENOUS at 16:19

## 2023-06-17 RX ADMIN — ANTI-FUNGAL POWDER MICONAZOLE NITRATE TALC FREE: 1.42 POWDER TOPICAL at 09:01

## 2023-06-17 RX ADMIN — SUCRALFATE 1 G: 1 TABLET ORAL at 12:43

## 2023-06-17 RX ADMIN — APIXABAN 2.5 MG: 2.5 TABLET, FILM COATED ORAL at 21:05

## 2023-06-17 RX ADMIN — AMPICILLIN SODIUM AND SULBACTAM SODIUM 3000 MG: 2; 1 INJECTION, POWDER, FOR SOLUTION INTRAMUSCULAR; INTRAVENOUS at 22:38

## 2023-06-17 RX ADMIN — AMPICILLIN SODIUM AND SULBACTAM SODIUM 3000 MG: 2; 1 INJECTION, POWDER, FOR SOLUTION INTRAMUSCULAR; INTRAVENOUS at 10:33

## 2023-06-17 RX ADMIN — AMPICILLIN SODIUM AND SULBACTAM SODIUM 3000 MG: 2; 1 INJECTION, POWDER, FOR SOLUTION INTRAMUSCULAR; INTRAVENOUS at 16:31

## 2023-06-17 RX ADMIN — ESCITALOPRAM OXALATE 10 MG: 10 TABLET ORAL at 09:00

## 2023-06-17 RX ADMIN — ATORVASTATIN CALCIUM 40 MG: 40 TABLET, FILM COATED ORAL at 21:05

## 2023-06-17 RX ADMIN — SODIUM CHLORIDE, PRESERVATIVE FREE 10 ML: 5 INJECTION INTRAVENOUS at 09:02

## 2023-06-17 RX ADMIN — METOPROLOL TARTRATE 50 MG: 50 TABLET, FILM COATED ORAL at 09:00

## 2023-06-17 RX ADMIN — ANTI-FUNGAL POWDER MICONAZOLE NITRATE TALC FREE: 1.42 POWDER TOPICAL at 21:05

## 2023-06-18 PROBLEM — J69.0 ASPIRATION PNEUMONIA (HCC): Status: ACTIVE | Noted: 2023-06-18

## 2023-06-18 LAB
ALBUMIN SERPL-MCNC: 3 G/DL (ref 3.5–5.2)
ALBUMIN/GLOB SERPL: 1.2 {RATIO} (ref 1–2.5)
ALP SERPL-CCNC: 102 U/L (ref 40–129)
ALT SERPL-CCNC: 63 U/L (ref 5–41)
ANION GAP SERPL CALCULATED.3IONS-SCNC: 13 MMOL/L (ref 9–17)
AST SERPL-CCNC: 112 U/L
BASOPHILS # BLD: 0 K/UL (ref 0–0.2)
BASOPHILS NFR BLD: 0 % (ref 0–2)
BILIRUB SERPL-MCNC: 0.5 MG/DL (ref 0.3–1.2)
BUN SERPL-MCNC: 47 MG/DL (ref 8–23)
CALCIUM SERPL-MCNC: 10.4 MG/DL (ref 8.6–10.4)
CHLORIDE SERPL-SCNC: 106 MMOL/L (ref 98–107)
CO2 SERPL-SCNC: 24 MMOL/L (ref 20–31)
CREAT SERPL-MCNC: 1.7 MG/DL (ref 0.7–1.2)
EOSINOPHIL # BLD: 0.2 K/UL (ref 0–0.4)
EOSINOPHILS RELATIVE PERCENT: 3 % (ref 1–4)
ERYTHROCYTE [DISTWIDTH] IN BLOOD BY AUTOMATED COUNT: 18.7 % (ref 12.5–15.4)
GFR SERPL CREATININE-BSD FRML MDRD: 39 ML/MIN/1.73M2
GLUCOSE SERPL-MCNC: 93 MG/DL (ref 70–99)
HCT VFR BLD AUTO: 28.1 % (ref 41–53)
HGB BLD-MCNC: 9.1 G/DL (ref 13.5–17.5)
LYMPHOCYTES # BLD: 29 % (ref 24–44)
LYMPHOCYTES NFR BLD: 2.2 K/UL (ref 1–4.8)
MCH RBC QN AUTO: 28.9 PG (ref 26–34)
MCHC RBC AUTO-ENTMCNC: 32.5 G/DL (ref 31–37)
MCV RBC AUTO: 89.1 FL (ref 80–100)
MONOCYTES NFR BLD: 0.6 K/UL (ref 0.1–1.2)
MONOCYTES NFR BLD: 8 % (ref 2–11)
NEUTROPHILS NFR BLD: 60 % (ref 36–66)
NEUTS SEG NFR BLD: 4.5 K/UL (ref 1.8–7.7)
PLATELET # BLD AUTO: 116 K/UL (ref 140–450)
PMV BLD AUTO: 7.7 FL (ref 6–12)
POTASSIUM SERPL-SCNC: 4 MMOL/L (ref 3.7–5.3)
PROT SERPL-MCNC: 5.6 G/DL (ref 6.4–8.3)
RBC # BLD AUTO: 3.16 M/UL (ref 4.5–5.9)
SODIUM SERPL-SCNC: 143 MMOL/L (ref 135–144)
WBC OTHER # BLD: 7.6 K/UL (ref 3.5–11)

## 2023-06-18 PROCEDURE — C9113 INJ PANTOPRAZOLE SODIUM, VIA: HCPCS | Performed by: HOSPITALIST

## 2023-06-18 PROCEDURE — 80053 COMPREHEN METABOLIC PANEL: CPT

## 2023-06-18 PROCEDURE — 2580000003 HC RX 258: Performed by: STUDENT IN AN ORGANIZED HEALTH CARE EDUCATION/TRAINING PROGRAM

## 2023-06-18 PROCEDURE — 1210000000 HC MED SURG R&B

## 2023-06-18 PROCEDURE — 6360000002 HC RX W HCPCS: Performed by: STUDENT IN AN ORGANIZED HEALTH CARE EDUCATION/TRAINING PROGRAM

## 2023-06-18 PROCEDURE — 99232 SBSQ HOSP IP/OBS MODERATE 35: CPT | Performed by: STUDENT IN AN ORGANIZED HEALTH CARE EDUCATION/TRAINING PROGRAM

## 2023-06-18 PROCEDURE — 6370000000 HC RX 637 (ALT 250 FOR IP): Performed by: STUDENT IN AN ORGANIZED HEALTH CARE EDUCATION/TRAINING PROGRAM

## 2023-06-18 PROCEDURE — 6370000000 HC RX 637 (ALT 250 FOR IP): Performed by: HOSPITALIST

## 2023-06-18 PROCEDURE — A4216 STERILE WATER/SALINE, 10 ML: HCPCS | Performed by: HOSPITALIST

## 2023-06-18 PROCEDURE — 85027 COMPLETE CBC AUTOMATED: CPT

## 2023-06-18 PROCEDURE — 36415 COLL VENOUS BLD VENIPUNCTURE: CPT

## 2023-06-18 PROCEDURE — 2580000003 HC RX 258: Performed by: HOSPITALIST

## 2023-06-18 PROCEDURE — 6360000002 HC RX W HCPCS: Performed by: HOSPITALIST

## 2023-06-18 RX ORDER — SODIUM CHLORIDE 9 MG/ML
INJECTION, SOLUTION INTRAVENOUS CONTINUOUS
Status: DISCONTINUED | OUTPATIENT
Start: 2023-06-18 | End: 2023-06-19 | Stop reason: HOSPADM

## 2023-06-18 RX ADMIN — APIXABAN 2.5 MG: 2.5 TABLET, FILM COATED ORAL at 20:59

## 2023-06-18 RX ADMIN — SUCRALFATE 1 G: 1 TABLET ORAL at 13:05

## 2023-06-18 RX ADMIN — ATORVASTATIN CALCIUM 40 MG: 40 TABLET, FILM COATED ORAL at 20:59

## 2023-06-18 RX ADMIN — ARIPIPRAZOLE 5 MG: 5 TABLET ORAL at 09:04

## 2023-06-18 RX ADMIN — SUCRALFATE 1 G: 1 TABLET ORAL at 09:04

## 2023-06-18 RX ADMIN — SUCRALFATE 1 G: 1 TABLET ORAL at 20:59

## 2023-06-18 RX ADMIN — AMPICILLIN SODIUM AND SULBACTAM SODIUM 3000 MG: 2; 1 INJECTION, POWDER, FOR SOLUTION INTRAMUSCULAR; INTRAVENOUS at 22:21

## 2023-06-18 RX ADMIN — SODIUM CHLORIDE: 9 INJECTION, SOLUTION INTRAVENOUS at 15:02

## 2023-06-18 RX ADMIN — Medication 1 TABLET: at 09:04

## 2023-06-18 RX ADMIN — SODIUM CHLORIDE, PRESERVATIVE FREE 40 MG: 5 INJECTION INTRAVENOUS at 06:18

## 2023-06-18 RX ADMIN — SODIUM CHLORIDE, PRESERVATIVE FREE 10 ML: 5 INJECTION INTRAVENOUS at 09:04

## 2023-06-18 RX ADMIN — AMPICILLIN SODIUM AND SULBACTAM SODIUM 3000 MG: 2; 1 INJECTION, POWDER, FOR SOLUTION INTRAMUSCULAR; INTRAVENOUS at 10:38

## 2023-06-18 RX ADMIN — METOPROLOL TARTRATE 50 MG: 50 TABLET, FILM COATED ORAL at 20:59

## 2023-06-18 RX ADMIN — SODIUM CHLORIDE, PRESERVATIVE FREE 40 MG: 5 INJECTION INTRAVENOUS at 16:40

## 2023-06-18 RX ADMIN — SUCRALFATE 1 G: 1 TABLET ORAL at 16:40

## 2023-06-18 RX ADMIN — ANTI-FUNGAL POWDER MICONAZOLE NITRATE TALC FREE: 1.42 POWDER TOPICAL at 20:59

## 2023-06-18 RX ADMIN — AMPICILLIN SODIUM AND SULBACTAM SODIUM 3000 MG: 2; 1 INJECTION, POWDER, FOR SOLUTION INTRAMUSCULAR; INTRAVENOUS at 04:02

## 2023-06-18 RX ADMIN — ESCITALOPRAM OXALATE 10 MG: 10 TABLET ORAL at 09:04

## 2023-06-18 RX ADMIN — ANTI-FUNGAL POWDER MICONAZOLE NITRATE TALC FREE: 1.42 POWDER TOPICAL at 09:03

## 2023-06-18 RX ADMIN — METOPROLOL TARTRATE 50 MG: 50 TABLET, FILM COATED ORAL at 09:04

## 2023-06-18 RX ADMIN — APIXABAN 2.5 MG: 2.5 TABLET, FILM COATED ORAL at 09:04

## 2023-06-18 RX ADMIN — AMPICILLIN SODIUM AND SULBACTAM SODIUM 3000 MG: 2; 1 INJECTION, POWDER, FOR SOLUTION INTRAMUSCULAR; INTRAVENOUS at 16:44

## 2023-06-19 VITALS
SYSTOLIC BLOOD PRESSURE: 117 MMHG | OXYGEN SATURATION: 96 % | HEART RATE: 63 BPM | DIASTOLIC BLOOD PRESSURE: 52 MMHG | HEIGHT: 70 IN | TEMPERATURE: 97.7 F | WEIGHT: 184.08 LBS | RESPIRATION RATE: 16 BRPM | BODY MASS INDEX: 26.35 KG/M2

## 2023-06-19 LAB
ALBUMIN SERPL-MCNC: 2.7 G/DL (ref 3.5–5.2)
ALBUMIN/GLOB SERPL: 1.2 {RATIO} (ref 1–2.5)
ALP SERPL-CCNC: 101 U/L (ref 40–129)
ALT SERPL-CCNC: 88 U/L (ref 5–41)
ANION GAP SERPL CALCULATED.3IONS-SCNC: 11 MMOL/L (ref 9–17)
AST SERPL-CCNC: 147 U/L
BASOPHILS # BLD: 0 K/UL (ref 0–0.2)
BASOPHILS NFR BLD: 0 % (ref 0–2)
BILIRUB SERPL-MCNC: 0.5 MG/DL (ref 0.3–1.2)
BUN SERPL-MCNC: 48 MG/DL (ref 8–23)
CALCIUM SERPL-MCNC: 10 MG/DL (ref 8.6–10.4)
CHLORIDE SERPL-SCNC: 109 MMOL/L (ref 98–107)
CO2 SERPL-SCNC: 24 MMOL/L (ref 20–31)
CREAT SERPL-MCNC: 1.54 MG/DL (ref 0.7–1.2)
EOSINOPHIL # BLD: 0.3 K/UL (ref 0–0.4)
EOSINOPHILS RELATIVE PERCENT: 4 % (ref 1–4)
ERYTHROCYTE [DISTWIDTH] IN BLOOD BY AUTOMATED COUNT: 18.3 % (ref 12.5–15.4)
GFR SERPL CREATININE-BSD FRML MDRD: 43 ML/MIN/1.73M2
GLUCOSE SERPL-MCNC: 116 MG/DL (ref 70–99)
HCT VFR BLD AUTO: 25.3 % (ref 41–53)
HGB BLD-MCNC: 8.2 G/DL (ref 13.5–17.5)
LYMPHOCYTES # BLD: 27 % (ref 24–44)
LYMPHOCYTES NFR BLD: 2.1 K/UL (ref 1–4.8)
MCH RBC QN AUTO: 28.7 PG (ref 26–34)
MCHC RBC AUTO-ENTMCNC: 32.3 G/DL (ref 31–37)
MCV RBC AUTO: 88.7 FL (ref 80–100)
MONOCYTES NFR BLD: 0.8 K/UL (ref 0.1–1.2)
MONOCYTES NFR BLD: 10 % (ref 2–11)
NEUTROPHILS NFR BLD: 59 % (ref 36–66)
NEUTS SEG NFR BLD: 4.5 K/UL (ref 1.8–7.7)
PLATELET # BLD AUTO: 105 K/UL (ref 140–450)
PMV BLD AUTO: 7.4 FL (ref 6–12)
POTASSIUM SERPL-SCNC: 4 MMOL/L (ref 3.7–5.3)
PROT SERPL-MCNC: 5 G/DL (ref 6.4–8.3)
RBC # BLD AUTO: 2.86 M/UL (ref 4.5–5.9)
SODIUM SERPL-SCNC: 144 MMOL/L (ref 135–144)
WBC OTHER # BLD: 7.7 K/UL (ref 3.5–11)

## 2023-06-19 PROCEDURE — 2580000003 HC RX 258: Performed by: STUDENT IN AN ORGANIZED HEALTH CARE EDUCATION/TRAINING PROGRAM

## 2023-06-19 PROCEDURE — 6370000000 HC RX 637 (ALT 250 FOR IP): Performed by: STUDENT IN AN ORGANIZED HEALTH CARE EDUCATION/TRAINING PROGRAM

## 2023-06-19 PROCEDURE — 6360000002 HC RX W HCPCS: Performed by: STUDENT IN AN ORGANIZED HEALTH CARE EDUCATION/TRAINING PROGRAM

## 2023-06-19 PROCEDURE — 99238 HOSP IP/OBS DSCHRG MGMT 30/<: CPT | Performed by: HOSPITALIST

## 2023-06-19 PROCEDURE — 2580000003 HC RX 258: Performed by: HOSPITALIST

## 2023-06-19 PROCEDURE — 36415 COLL VENOUS BLD VENIPUNCTURE: CPT

## 2023-06-19 PROCEDURE — 6360000002 HC RX W HCPCS: Performed by: HOSPITALIST

## 2023-06-19 PROCEDURE — C9113 INJ PANTOPRAZOLE SODIUM, VIA: HCPCS | Performed by: HOSPITALIST

## 2023-06-19 PROCEDURE — 85027 COMPLETE CBC AUTOMATED: CPT

## 2023-06-19 PROCEDURE — 6370000000 HC RX 637 (ALT 250 FOR IP): Performed by: HOSPITALIST

## 2023-06-19 PROCEDURE — 2580000003 HC RX 258: Performed by: EMERGENCY MEDICINE

## 2023-06-19 PROCEDURE — 80053 COMPREHEN METABOLIC PANEL: CPT

## 2023-06-19 RX ORDER — AMOXICILLIN AND CLAVULANATE POTASSIUM 875; 125 MG/1; MG/1
1 TABLET, FILM COATED ORAL 2 TIMES DAILY
Qty: 14 TABLET | Refills: 0
Start: 2023-06-19 | End: 2023-06-26

## 2023-06-19 RX ADMIN — SODIUM CHLORIDE, PRESERVATIVE FREE 3 ML: 5 INJECTION INTRAVENOUS at 05:12

## 2023-06-19 RX ADMIN — APIXABAN 2.5 MG: 2.5 TABLET, FILM COATED ORAL at 10:09

## 2023-06-19 RX ADMIN — SUCRALFATE 1 G: 1 TABLET ORAL at 10:10

## 2023-06-19 RX ADMIN — AMPICILLIN SODIUM AND SULBACTAM SODIUM 3000 MG: 2; 1 INJECTION, POWDER, FOR SOLUTION INTRAMUSCULAR; INTRAVENOUS at 11:06

## 2023-06-19 RX ADMIN — METOPROLOL TARTRATE 50 MG: 50 TABLET, FILM COATED ORAL at 10:09

## 2023-06-19 RX ADMIN — ARIPIPRAZOLE 5 MG: 5 TABLET ORAL at 10:09

## 2023-06-19 RX ADMIN — Medication 1 TABLET: at 10:10

## 2023-06-19 RX ADMIN — ANTI-FUNGAL POWDER MICONAZOLE NITRATE TALC FREE: 1.42 POWDER TOPICAL at 10:22

## 2023-06-19 RX ADMIN — SODIUM CHLORIDE, PRESERVATIVE FREE 40 MG: 5 INJECTION INTRAVENOUS at 05:10

## 2023-06-19 RX ADMIN — AMPICILLIN SODIUM AND SULBACTAM SODIUM 3000 MG: 2; 1 INJECTION, POWDER, FOR SOLUTION INTRAMUSCULAR; INTRAVENOUS at 05:14

## 2023-06-19 RX ADMIN — ESCITALOPRAM OXALATE 10 MG: 10 TABLET ORAL at 10:09

## 2023-06-19 NOTE — CARE COORDINATION
Patients daughter called LSW and was concerned about staff not knowing about her dad being transported there. LSW contacted [facility] and spoke with [facility] in admissions who states that she will call family and confirm the plan with them and provide reassurance that they are expecting patient today.

## 2023-06-19 NOTE — PROGRESS NOTES
[facility]  Office: [address], DO, Lc Pritchard, DO, Yris Hearn, DO, Pia Ruiz Blood, DO, Ace Ceballos MD, Rojas Arechiga MD, Glenda Crigler, MD, Kassandra Gerber MD,  Vin King MD, Binh Hall MD, Shanna Antonio, DO, Damaris Randall MD,  Mckenzie Graham MD, Chaparro Ramirez MD, Asha Schrader DO, Joey Romano MD, Nadya Schmid MD, Whitney Del Castillo DO, Pedrito Rubio MD, Aicha Chu MD, Crystal Flaherty MD, Demetria Bailey MD,  Kadie Mariscal DO, Ginger Ruiz MD,  Arvind Campbell, CNP,  Loretta Olivera, CNP, Jose Sal, CNP, Mickie Smith, CNP,  Raquel Whitley, [facility], Bridget Cooper, CNP, Sofiya Pinedo, CNP, Cuco Barrios, CNP, Marcos Faulkner, CNP, Bimal Carmona, CNP, Randall Baldwin PA-C, Chanda Rodriguez, CNS, Saima Ivory, CNP, Smita Ely, [address] [place] [address]    Progress Note    6/19/2023    10:19 AM    Name:   Yvonne Rojo  MRN:     [id number]     [place]:      [de-identified]   Room:   [address] [place] [id number] Day:  6  Admit Date:  6/13/2023 10:53 AM    PCP:   Flor Bernard MD  Code Status:  DNR-CCA    Subjective:     Mr. Ovidio Cruz is a pleasant 81year old gentlemen being seen in follow up for dehydration. He is more communicative today. Renal labs have improved and today reveal BUN of 48, and creatinine of 1.54.  06/15 chest x-ray revealed low lung volumes with mild vascular congestion. The patient was placed on Unasyn for aspiration pneumonia which will be transitioned to Augmentin upon discharge. Speech therapy following patient and diet was modified to easy to chew solids/thin liquids. 06/16 Echo revealed an EF 50-55%. He remains hemodynamically stable, denies any shortness of breath, and denies any pain or concerns at this time. He will be discharged to SNF. Medications:      Allergies:  No Known Allergies    Current Meds:   Scheduled Meds:    ampicillin-sulbactam  3,000 mg IntraVENous

## 2023-06-19 NOTE — DISCHARGE SUMMARY
[facility]  Office: [address], DO, Gisselle Omena, DO, Justin Jay, DO, Barbi Green Blood, DO, Ryland Corona MD, Gabriella Ochoa MD, Len Vallejo MD, Joey Roe MD,  Marvin Petty MD, Duc Howard MD, David Corona DO, Carolyn Waddell MD,  Aneudy Roach MD, Kirstin Arevalo MD, Francine Kelly DO, Luz Senior MD, Areli Castillo MD, Brayden Pearl DO, Giovani Chrsi MD, Melecio Banks MD, Ning Roger MD, Kayden Henson MD,  Azucena Vazquez DO, Martinez Branch MD,  Macrina Schwartz, CNP,  Francis Gonsalez, CNP, Sarah Girard, CNP, Elihu Dakin, CNP,  Desire Luevano, [facility], Bhanu Noel, CNP, Colonel Monroe, CNP, Cris Haywood, CNP, Dariel Selby, CNP, Felisa Bergman, CNP, Janee Puga, PA-C, Farrah Newell, CNS, Sonia Canales, CNP, Lois Carter, [address]    Discharge Summary     Patient ID: Daniel Carrington  :  1935   MRN: [id number]     ACCOUNT:  [de-identified]   Patient's PCP: Mark Mohs, MD  Admit Date: 2023   Discharge Date: 2023  Length of Stay: 6  Code Status:  DNR-CCA  Admitting Physician: Kayden Henson MD  Discharge Physician: Sachin Hughes DO     Active Discharge Diagnoses:     Hospital Problem Lists:  Principal Problem:    Dehydration  Active Problems:    S/p TAVR (transcatheter aortic valve replacement), bioprosthetic    Pure hypercholesterolemia    Long term (current) use of anticoagulants    Benign essential hypertension    PAF (paroxysmal atrial fibrillation) (HCC)    Aphasia    Aspiration pneumonia [address] [address]  Resolved Problems:    * No resolved hospital problems. *      Admission Condition:  poor     Discharged Condition: fair    Hospital Stay:     Hospital Course:  Mr. Balta Alvares is a pleasant 81year old gentlemen being seen in follow up for dehydration, and generalized weakness. He is more communicative today.   Renal labs have improved and today

## 2023-06-19 NOTE — PLAN OF CARE
Problem: Discharge Planning  Goal: Discharge to home or other facility with appropriate resources  6/19/2023 1150 by Kendrick Woo RN  Outcome: Adequate for Discharge  6/19/2023 0534 by Carl Ricardo RN  Outcome: Progressing  Flowsheets  Taken 6/19/2023 0534  Discharge to home or other facility with appropriate resources:   Identify barriers to discharge with patient and caregiver   Arrange for needed discharge resources and transportation as appropriate   Identify discharge learning needs (meds, wound care, etc)  Taken 6/18/2023 2000  Discharge to home or other facility with appropriate resources: Identify barriers to discharge with patient and caregiver     Problem: Skin/Tissue Integrity  Goal: Absence of new skin breakdown  Description: 1. Monitor for areas of redness and/or skin breakdown  2. Assess vascular access sites hourly  3. Every 4-6 hours minimum:  Change oxygen saturation probe site  4. Every 4-6 hours:  If on nasal continuous positive airway pressure, respiratory therapy assess nares and determine need for appliance change or resting period. 6/19/2023 1150 by Kendrick Woo RN  Outcome: Adequate for Discharge  6/19/2023 0534 by Carl Ricardo RN  Outcome: Progressing     Problem: ABCDS Injury Assessment  Goal: Absence of physical injury  6/19/2023 1150 by Kendrick Woo RN  Outcome: Adequate for Discharge  6/19/2023 0534 by Carl Ricardo RN  Outcome: Progressing  Flowsheets (Taken 6/17/2023 0730 by Beltran Jett RN)  Absence of Physical Injury: Implement safety measures based on patient assessment     Problem: Confusion  Goal: Confusion, delirium, dementia, or psychosis is improved or at baseline  Description: INTERVENTIONS:  1. Assess for possible contributors to thought disturbance, including medications, impaired vision or hearing, underlying metabolic abnormalities, dehydration, psychiatric diagnoses, and notify attending LIP  2.  [place] high risk fall precautions, as

## 2023-06-19 NOTE — PLAN OF CARE
This pt has been seen by [facility] in the past.  Will defer to them for further workup.

## 2023-06-19 NOTE — CARE COORDINATION
Social work: [facility] will have bed for patient today. Patient to dc to [facility] via [address] [facility] [address]  at 1:30PM.  # for RN report: [phone]. Completed [facility] faxed to [phone]. Informed RN, pt, and facility of dc time, agreeable to plan.

## 2023-06-19 NOTE — PLAN OF CARE
Problem: Discharge Planning  Goal: Discharge to home or other facility with appropriate resources  6/19/2023 0534 by Salazar Chowdhury RN  Outcome: Progressing  Flowsheets  Taken 6/19/2023 0534  Discharge to home or other facility with appropriate resources:   Identify barriers to discharge with patient and caregiver   Arrange for needed discharge resources and transportation as appropriate   Identify discharge learning needs (meds, wound care, etc)  Taken 6/18/2023 2000  Discharge to home or other facility with appropriate resources: Identify barriers to discharge with patient and caregiver  6/18/2023 1602 by Indio Orr RN  Outcome: Progressing  Flowsheets (Taken 6/18/2023 0800)  Discharge to home or other facility with appropriate resources: Identify barriers to discharge with patient and caregiver     Problem: Skin/Tissue Integrity  Goal: Absence of new skin breakdown  Description: 1. Monitor for areas of redness and/or skin breakdown  2. Assess vascular access sites hourly  3. Every 4-6 hours minimum:  Change oxygen saturation probe site  4. Every 4-6 hours:  If on nasal continuous positive airway pressure, respiratory therapy assess nares and determine need for appliance change or resting period. 6/19/2023 0534 by Salazar Chowdhury RN  Outcome: Progressing  6/18/2023 1602 by Indio Orr RN  Outcome: Progressing     Problem: ABCDS Injury Assessment  Goal: Absence of physical injury  6/19/2023 0534 by Salazar Chowdhury RN  Outcome: Progressing  Flowsheets (Taken 6/17/2023 0730 by Phillip Harper RN)  Absence of Physical Injury: Implement safety measures based on patient assessment  6/18/2023 1602 by Indio Orr RN  Outcome: Progressing     Problem: Confusion  Goal: Confusion, delirium, dementia, or psychosis is improved or at baseline  Description: INTERVENTIONS:  1.  Assess for possible contributors to thought disturbance, including medications, impaired vision or hearing, underlying metabolic

## 2023-06-19 NOTE — PLAN OF CARE
Problem: Discharge Planning  Goal: Discharge to home or other facility with appropriate resources  6/19/2023 1150 by Madeline Stevens RN  Outcome: Adequate for Discharge  6/19/2023 0534 by Doc Parnell RN  Outcome: Progressing  Flowsheets  Taken 6/19/2023 0534  Discharge to home or other facility with appropriate resources:   Identify barriers to discharge with patient and caregiver   Arrange for needed discharge resources and transportation as appropriate   Identify discharge learning needs (meds, wound care, etc)  Taken 6/18/2023 2000  Discharge to home or other facility with appropriate resources: Identify barriers to discharge with patient and caregiver     Problem: Skin/Tissue Integrity  Goal: Absence of new skin breakdown  Description: 1. Monitor for areas of redness and/or skin breakdown  2. Assess vascular access sites hourly  3. Every 4-6 hours minimum:  Change oxygen saturation probe site  4. Every 4-6 hours:  If on nasal continuous positive airway pressure, respiratory therapy assess nares and determine need for appliance change or resting period. 6/19/2023 1150 by Madeline Stevens RN  Outcome: Adequate for Discharge  6/19/2023 0534 by Doc Parnell RN  Outcome: Progressing     Problem: ABCDS Injury Assessment  Goal: Absence of physical injury  6/19/2023 1150 by Madeline Stevens RN  Outcome: Adequate for Discharge  6/19/2023 0534 by Doc Parnell RN  Outcome: Progressing  Flowsheets (Taken 6/17/2023 0730 by Anyi Miller RN)  Absence of Physical Injury: Implement safety measures based on patient assessment     Problem: Confusion  Goal: Confusion, delirium, dementia, or psychosis is improved or at baseline  Description: INTERVENTIONS:  1. Assess for possible contributors to thought disturbance, including medications, impaired vision or hearing, underlying metabolic abnormalities, dehydration, psychiatric diagnoses, and notify attending LIP  2.  [place] high risk fall precautions, as

## 2023-07-08 ENCOUNTER — HOSPITAL ENCOUNTER (INPATIENT)
Age: 88
LOS: 2 days | Discharge: HOSPICE | DRG: 641 | End: 2023-07-10
Attending: EMERGENCY MEDICINE | Admitting: HOSPITALIST
Payer: MEDICARE

## 2023-07-08 DIAGNOSIS — E86.0 DEHYDRATION: Primary | ICD-10-CM

## 2023-07-08 DIAGNOSIS — N17.9 AKI (ACUTE KIDNEY INJURY) (HCC): ICD-10-CM

## 2023-07-08 PROBLEM — E83.52 HYPERCALCEMIA: Status: ACTIVE | Noted: 2023-07-08

## 2023-07-08 LAB
ALBUMIN SERPL-MCNC: 3.4 G/DL (ref 3.5–5.2)
ALBUMIN/GLOB SERPL: 1.6 {RATIO} (ref 1–2.5)
ALP SERPL-CCNC: 87 U/L (ref 40–129)
ALT SERPL-CCNC: 15 U/L (ref 5–41)
ANION GAP SERPL CALCULATED.3IONS-SCNC: 8 MMOL/L (ref 9–17)
AST SERPL-CCNC: 48 U/L
BASOPHILS # BLD: 0 K/UL (ref 0–0.2)
BASOPHILS NFR BLD: 0 % (ref 0–2)
BILIRUB SERPL-MCNC: 0.5 MG/DL (ref 0.3–1.2)
BUN SERPL-MCNC: 42 MG/DL (ref 8–23)
CALCIUM SERPL-MCNC: 12.7 MG/DL (ref 8.6–10.4)
CHLORIDE SERPL-SCNC: 105 MMOL/L (ref 98–107)
CO2 SERPL-SCNC: 27 MMOL/L (ref 20–31)
CREAT SERPL-MCNC: 2.91 MG/DL (ref 0.7–1.2)
EOSINOPHIL # BLD: 0.09 K/UL (ref 0–0.4)
EOSINOPHILS RELATIVE PERCENT: 1 % (ref 1–4)
ERYTHROCYTE [DISTWIDTH] IN BLOOD BY AUTOMATED COUNT: 19.5 % (ref 12.5–15.4)
GFR SERPL CREATININE-BSD FRML MDRD: 20 ML/MIN/1.73M2
GLUCOSE SERPL-MCNC: 124 MG/DL (ref 70–99)
HCT VFR BLD AUTO: 27.7 % (ref 41–53)
HGB BLD-MCNC: 8.8 G/DL (ref 13.5–17.5)
INR PPP: 1.2
LYMPHOCYTES # BLD: 49 % (ref 24–44)
LYMPHOCYTES NFR BLD: 4.51 K/UL (ref 1–4.8)
MAGNESIUM SERPL-MCNC: 1.9 MG/DL (ref 1.6–2.6)
MCH RBC QN AUTO: 28.9 PG (ref 26–34)
MCHC RBC AUTO-ENTMCNC: 31.7 G/DL (ref 31–37)
MCV RBC AUTO: 91.2 FL (ref 80–100)
MONOCYTES NFR BLD: 0.83 K/UL (ref 0.1–0.8)
MONOCYTES NFR BLD: 9 % (ref 1–7)
MORPHOLOGY: NORMAL
NEUTROPHILS NFR BLD: 41 % (ref 36–66)
NEUTS SEG NFR BLD: 3.77 K/UL (ref 1.8–7.7)
PARTIAL THROMBOPLASTIN TIME: 23.2 SEC (ref 21.3–31.3)
PLATELET # BLD AUTO: 63 K/UL (ref 140–450)
PMV BLD AUTO: 7.4 FL (ref 6–12)
POTASSIUM SERPL-SCNC: 4.8 MMOL/L (ref 3.7–5.3)
PROT SERPL-MCNC: 5.5 G/DL (ref 6.4–8.3)
PROTHROMBIN TIME: 12.7 SEC (ref 9.4–12.6)
RBC # BLD AUTO: 3.04 M/UL (ref 4.5–5.9)
SODIUM SERPL-SCNC: 140 MMOL/L (ref 135–144)
TROPONIN I SERPL HS-MCNC: 137 NG/L (ref 0–22)
TROPONIN I SERPL HS-MCNC: 140 NG/L (ref 0–22)
WBC OTHER # BLD: 9.2 K/UL (ref 3.5–11)

## 2023-07-08 PROCEDURE — 83735 ASSAY OF MAGNESIUM: CPT

## 2023-07-08 PROCEDURE — 80053 COMPREHEN METABOLIC PANEL: CPT

## 2023-07-08 PROCEDURE — 85027 COMPLETE CBC AUTOMATED: CPT

## 2023-07-08 PROCEDURE — 99285 EMERGENCY DEPT VISIT HI MDM: CPT

## 2023-07-08 PROCEDURE — 2580000003 HC RX 258: Performed by: EMERGENCY MEDICINE

## 2023-07-08 PROCEDURE — 36415 COLL VENOUS BLD VENIPUNCTURE: CPT

## 2023-07-08 PROCEDURE — 1200000000 HC SEMI PRIVATE

## 2023-07-08 PROCEDURE — 85730 THROMBOPLASTIN TIME PARTIAL: CPT

## 2023-07-08 PROCEDURE — 84484 ASSAY OF TROPONIN QUANT: CPT

## 2023-07-08 PROCEDURE — 93005 ELECTROCARDIOGRAM TRACING: CPT | Performed by: EMERGENCY MEDICINE

## 2023-07-08 PROCEDURE — 2580000003 HC RX 258: Performed by: HOSPITALIST

## 2023-07-08 PROCEDURE — 85610 PROTHROMBIN TIME: CPT

## 2023-07-08 RX ORDER — ACETAMINOPHEN 325 MG/1
650 TABLET ORAL EVERY 6 HOURS PRN
Status: DISCONTINUED | OUTPATIENT
Start: 2023-07-08 | End: 2023-07-10 | Stop reason: HOSPADM

## 2023-07-08 RX ORDER — SODIUM CHLORIDE 9 MG/ML
INJECTION, SOLUTION INTRAVENOUS PRN
Status: DISCONTINUED | OUTPATIENT
Start: 2023-07-08 | End: 2023-07-10 | Stop reason: HOSPADM

## 2023-07-08 RX ORDER — SODIUM CHLORIDE 9 MG/ML
INJECTION, SOLUTION INTRAVENOUS CONTINUOUS
Status: DISCONTINUED | OUTPATIENT
Start: 2023-07-08 | End: 2023-07-10

## 2023-07-08 RX ORDER — ACETAMINOPHEN 650 MG/1
650 SUPPOSITORY RECTAL EVERY 6 HOURS PRN
Status: DISCONTINUED | OUTPATIENT
Start: 2023-07-08 | End: 2023-07-10 | Stop reason: HOSPADM

## 2023-07-08 RX ORDER — METOPROLOL TARTRATE 50 MG/1
50 TABLET, FILM COATED ORAL 2 TIMES DAILY
Status: DISCONTINUED | OUTPATIENT
Start: 2023-07-08 | End: 2023-07-10 | Stop reason: HOSPADM

## 2023-07-08 RX ORDER — M-VIT,TX,IRON,MINS/CALC/FOLIC 27MG-0.4MG
1 TABLET ORAL DAILY
Status: DISCONTINUED | OUTPATIENT
Start: 2023-07-08 | End: 2023-07-10 | Stop reason: HOSPADM

## 2023-07-08 RX ORDER — PANTOPRAZOLE SODIUM 40 MG/1
40 TABLET, DELAYED RELEASE ORAL
Status: DISCONTINUED | OUTPATIENT
Start: 2023-07-09 | End: 2023-07-10 | Stop reason: HOSPADM

## 2023-07-08 RX ORDER — SODIUM CHLORIDE 0.9 % (FLUSH) 0.9 %
5-40 SYRINGE (ML) INJECTION EVERY 12 HOURS SCHEDULED
Status: DISCONTINUED | OUTPATIENT
Start: 2023-07-08 | End: 2023-07-10 | Stop reason: HOSPADM

## 2023-07-08 RX ORDER — SODIUM CHLORIDE 0.9 % (FLUSH) 0.9 %
5-40 SYRINGE (ML) INJECTION PRN
Status: DISCONTINUED | OUTPATIENT
Start: 2023-07-08 | End: 2023-07-10 | Stop reason: HOSPADM

## 2023-07-08 RX ORDER — ATORVASTATIN CALCIUM 40 MG/1
40 TABLET, FILM COATED ORAL DAILY
Status: DISCONTINUED | OUTPATIENT
Start: 2023-07-08 | End: 2023-07-10 | Stop reason: HOSPADM

## 2023-07-08 RX ORDER — ONDANSETRON 4 MG/1
4 TABLET, ORALLY DISINTEGRATING ORAL EVERY 8 HOURS PRN
Status: DISCONTINUED | OUTPATIENT
Start: 2023-07-08 | End: 2023-07-10 | Stop reason: HOSPADM

## 2023-07-08 RX ORDER — ESCITALOPRAM OXALATE 10 MG/1
10 TABLET ORAL DAILY
Status: DISCONTINUED | OUTPATIENT
Start: 2023-07-08 | End: 2023-07-10 | Stop reason: HOSPADM

## 2023-07-08 RX ORDER — ARIPIPRAZOLE 5 MG/1
5 TABLET ORAL DAILY
Status: DISCONTINUED | OUTPATIENT
Start: 2023-07-08 | End: 2023-07-10 | Stop reason: HOSPADM

## 2023-07-08 RX ORDER — 0.9 % SODIUM CHLORIDE 0.9 %
1000 INTRAVENOUS SOLUTION INTRAVENOUS ONCE
Status: COMPLETED | OUTPATIENT
Start: 2023-07-08 | End: 2023-07-08

## 2023-07-08 RX ORDER — NIACIN 500 MG/1
1000 TABLET, EXTENDED RELEASE ORAL DAILY
Status: DISCONTINUED | OUTPATIENT
Start: 2023-07-08 | End: 2023-07-10 | Stop reason: HOSPADM

## 2023-07-08 RX ORDER — SUCRALFATE 1 G/1
1 TABLET ORAL 4 TIMES DAILY
Status: DISCONTINUED | OUTPATIENT
Start: 2023-07-08 | End: 2023-07-10 | Stop reason: HOSPADM

## 2023-07-08 RX ORDER — ONDANSETRON 2 MG/ML
4 INJECTION INTRAMUSCULAR; INTRAVENOUS EVERY 6 HOURS PRN
Status: DISCONTINUED | OUTPATIENT
Start: 2023-07-08 | End: 2023-07-10 | Stop reason: HOSPADM

## 2023-07-08 RX ADMIN — SODIUM CHLORIDE: 9 INJECTION, SOLUTION INTRAVENOUS at 21:12

## 2023-07-08 RX ADMIN — SODIUM CHLORIDE 1000 ML: 9 INJECTION, SOLUTION INTRAVENOUS at 19:05

## 2023-07-08 ASSESSMENT — ENCOUNTER SYMPTOMS
NAUSEA: 0
VOMITING: 0
DIARRHEA: 0
ABDOMINAL PAIN: 0
SHORTNESS OF BREATH: 0
SORE THROAT: 0

## 2023-07-09 LAB
ANION GAP SERPL CALCULATED.3IONS-SCNC: 7 MMOL/L (ref 9–17)
BUN SERPL-MCNC: 39 MG/DL (ref 8–23)
CALCIUM SERPL-MCNC: 11.5 MG/DL (ref 8.6–10.4)
CHLORIDE SERPL-SCNC: 107 MMOL/L (ref 98–107)
CO2 SERPL-SCNC: 27 MMOL/L (ref 20–31)
CREAT SERPL-MCNC: 2.58 MG/DL (ref 0.7–1.2)
GFR SERPL CREATININE-BSD FRML MDRD: 23 ML/MIN/1.73M2
GLUCOSE BLD-MCNC: 96 MG/DL (ref 75–110)
GLUCOSE SERPL-MCNC: 85 MG/DL (ref 70–99)
POTASSIUM SERPL-SCNC: 4.6 MMOL/L (ref 3.7–5.3)
SODIUM SERPL-SCNC: 141 MMOL/L (ref 135–144)

## 2023-07-09 PROCEDURE — 80048 BASIC METABOLIC PNL TOTAL CA: CPT

## 2023-07-09 PROCEDURE — 1200000000 HC SEMI PRIVATE

## 2023-07-09 PROCEDURE — 82947 ASSAY GLUCOSE BLOOD QUANT: CPT

## 2023-07-09 PROCEDURE — 2580000003 HC RX 258: Performed by: HOSPITALIST

## 2023-07-09 PROCEDURE — 99222 1ST HOSP IP/OBS MODERATE 55: CPT | Performed by: HOSPITALIST

## 2023-07-09 PROCEDURE — 36415 COLL VENOUS BLD VENIPUNCTURE: CPT

## 2023-07-09 PROCEDURE — 6370000000 HC RX 637 (ALT 250 FOR IP): Performed by: HOSPITALIST

## 2023-07-09 RX ADMIN — SODIUM CHLORIDE: 9 INJECTION, SOLUTION INTRAVENOUS at 13:32

## 2023-07-10 VITALS
BODY MASS INDEX: 27.43 KG/M2 | HEIGHT: 69 IN | DIASTOLIC BLOOD PRESSURE: 55 MMHG | TEMPERATURE: 97.9 F | SYSTOLIC BLOOD PRESSURE: 110 MMHG | RESPIRATION RATE: 16 BRPM | HEART RATE: 71 BPM | OXYGEN SATURATION: 96 % | WEIGHT: 185.19 LBS

## 2023-07-10 LAB
ANION GAP SERPL CALCULATED.3IONS-SCNC: 10 MMOL/L (ref 9–17)
BUN SERPL-MCNC: 39 MG/DL (ref 8–23)
CALCIUM SERPL-MCNC: 11.6 MG/DL (ref 8.6–10.4)
CHLORIDE SERPL-SCNC: 108 MMOL/L (ref 98–107)
CO2 SERPL-SCNC: 25 MMOL/L (ref 20–31)
CREAT SERPL-MCNC: 2.4 MG/DL (ref 0.7–1.2)
EKG ATRIAL RATE: 64 BPM
EKG P AXIS: 47 DEGREES
EKG P-R INTERVAL: 146 MS
EKG Q-T INTERVAL: 456 MS
EKG QRS DURATION: 148 MS
EKG QTC CALCULATION (BAZETT): 470 MS
EKG R AXIS: -13 DEGREES
EKG T AXIS: 95 DEGREES
EKG VENTRICULAR RATE: 64 BPM
GFR SERPL CREATININE-BSD FRML MDRD: 25 ML/MIN/1.73M2
GLUCOSE SERPL-MCNC: 90 MG/DL (ref 70–99)
POTASSIUM SERPL-SCNC: 4.9 MMOL/L (ref 3.7–5.3)
SODIUM SERPL-SCNC: 143 MMOL/L (ref 135–144)

## 2023-07-10 PROCEDURE — 99232 SBSQ HOSP IP/OBS MODERATE 35: CPT | Performed by: HOSPITALIST

## 2023-07-10 PROCEDURE — 36415 COLL VENOUS BLD VENIPUNCTURE: CPT

## 2023-07-10 PROCEDURE — 80048 BASIC METABOLIC PNL TOTAL CA: CPT

## 2023-07-10 PROCEDURE — 6360000002 HC RX W HCPCS: Performed by: HOSPITALIST

## 2023-07-10 RX ORDER — MORPHINE SULFATE 2 MG/ML
4 INJECTION, SOLUTION INTRAMUSCULAR; INTRAVENOUS
Status: DISCONTINUED | OUTPATIENT
Start: 2023-07-10 | End: 2023-07-10 | Stop reason: HOSPADM

## 2023-07-10 RX ORDER — MORPHINE SULFATE 2 MG/ML
2 INJECTION, SOLUTION INTRAMUSCULAR; INTRAVENOUS
Status: DISCONTINUED | OUTPATIENT
Start: 2023-07-10 | End: 2023-07-10 | Stop reason: HOSPADM

## 2023-07-10 RX ORDER — LORAZEPAM 2 MG/ML
1 INJECTION INTRAMUSCULAR EVERY 4 HOURS PRN
Status: DISCONTINUED | OUTPATIENT
Start: 2023-07-10 | End: 2023-07-10 | Stop reason: HOSPADM

## 2023-07-10 RX ADMIN — MORPHINE SULFATE 4 MG: 2 INJECTION, SOLUTION INTRAMUSCULAR; INTRAVENOUS at 13:11

## 2023-07-10 RX ADMIN — MORPHINE SULFATE 4 MG: 2 INJECTION, SOLUTION INTRAMUSCULAR; INTRAVENOUS at 17:06

## 2023-07-10 NOTE — ACP (ADVANCE CARE PLANNING)
..Advance Care Planning     Advance Care Planning Activator (Inpatient)  Conversation Note      Date of ACP Conversation: 7/10/2023     [employer] with: I talk with the patient's daughter Christos Carlson. Her Mom Luis Umaña is the patient's spouse and legal decision maker and she is the alternate. ACP Activator: Storm Tracey RN          Health Care Decision Maker: Vianey Mendez [phone] as she is on [place] assisted living     Current Designated Health Care Decision Maker:     Click here to complete [address] including section of the Healthcare Decision Maker Relationship (ie \"Primary\")      Care Preferences    Ventilation: \"If you were in your present state of health and suddenly became very ill and were unable to breathe on your own, what would your preference be about the use of a ventilator (breathing machine) if it were available to you? \"      Would the patient desire the use of ventilator (breathing machine)?: no    \"If your health worsens and it becomes clear that your chance of recovery is unlikely, what would your preference be about the use of a ventilator (breathing machine) if it were available to you? \"     Would the patient desire the use of ventilator (breathing machine)?: No      Resuscitation  \"CPR works best to restart the heart when there is a sudden event, like a heart attack, in someone who is otherwise healthy. Unfortunately, CPR does not typically restart the heart for people who have serious health conditions or who are very sick. \"    \"In the event your heart stopped as a result of an underlying serious health condition, would you want attempts to be made to restart your heart (answer \"yes\" for attempt to resuscitate) or would you prefer a natural death (answer \"no\" for do not attempt to resuscitate)? \" no       [x] Yes   [] No   Educated Patient / [employer] S[employer] regarding differences between Advance Directives and portable DNR orders.     Length of ACP

## 2023-07-10 NOTE — DISCHARGE INSTR - COC
Continuity of Care Form    Patient Name: Marika Arriaga   :  1935  MRN:  [id number]    [address] date:  2023  Discharge date:  ***    Code Status Order: DNR-CC   Advance Directives:     Admitting Physician:  Alexys Gutierrez DO  PCP: Kelsea Gandhi MD    Discharging Nurse: [facility] Unit/Room#: 318/318-01  Discharging Unit Phone Number: ***    Emergency Contact:   Extended Emergency Contact Information  Primary Emergency Contact: [address] Phone: [phone]  Mobile Phone: [phone]  Relation: Child  Secondary Emergency Contact: [address] Phone: [phone]  Mobile Phone: [phone]  Relation: Spouse  Preferred language: English    Past Surgical History:  Past Surgical History:   Procedure Laterality Date    ARTERIAL BYPASS SURGRY  2004    CARDIAC CATHETERIZATION      CARDIAC VALVE REPLACEMENT  2019    ENDOSCOPY, COLON, DIAGNOSTIC      MOUTH BIOPSY  2019    tonsil     UPPER GASTROINTESTINAL ENDOSCOPY  2022    UPPER GASTROINTESTINAL ENDOSCOPY N/A 2022    EGD CONTROL HEMORRHAGE performed by Gavi Hoyos MD at [address]       Immunization History:   Immunization History   Administered Date(s) Administered    Influenza Vaccine, unspecified formulation 10/08/2010    Influenza, Triv, inactivated, subunit, adjuvanted, IM (Fluad 65 yrs and older) 2018, 10/07/2019    Pneumococcal, PCV-13, PREVNAR 15, (age 6w+), IM, 0.5mL 2016    TDaP, ADACEL (age 6y-58y), BOOSTRIX (age 10y+), IM, 0.5mL 2020       Active Problems:  Patient Active Problem List   Diagnosis Code    Stenosis of carotid artery I65.29    S/p TAVR (transcatheter aortic valve replacement), 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
Additional Notes: Patient consent was obtained to proceed with the visit and recommended plan of care after discussion of all risks and benefits, including the risks of COVID-19 exposure.
Render Risk Assessment In Note?: yes
Detail Level: Simple

## 2023-07-10 NOTE — CARE COORDINATION
Discharge Planning    Spoke with daughter Celso Lomas regarding discharge plan. They wish that patient be evaluated by [facility] for potential inpatient hospice care. Referral made to [facility] and they will contact daughter to discuss. If he does not qualify for inpatient hospice services they would like patient to return to [facility] for hospice services. Contacted Joi Fall from [facility] regarding potential referral for hospice services if not accepted as inpatient at [address] [facility] daughter [phone]. Will identify if there is POA paperwork for patient    7/10/2023 1156  Spoke with Hospice and they are meeting with wife and daughter at [facility] to discuss hospice options.

## 2023-07-10 NOTE — PROGRESS NOTES
[facility]  Office: [address], DO, Marisa Quezada, DO, Marjorie Joyner, DO, Yuli Mcqueen, DO, Yasmine Cummins MD, Isaiah Johnston MD, Cate Marsh MD, Iliana Coates MD,  Irena Keane MD, Ivy Aguayo MD, Mary Vasquez, DO, Gregor Vences MD,  Joe Mcwilliams MD, Risa Rajput MD, Coco Lee, DO, Elgin Ahn MD,  Greg Florez, DO, Alma Bruce MD, Jh Crane MD, Ann-Marie Carbajal MD, Von Morillo MD,  Brandon Montaño MD, Parisa Michael MD, Sayra Rodriguez DO, Sonal Kevin MD,  Yuliet Alford MD, Iman Jones, CNP,  Lv Flanagan, CNP, Cody De Leon, CNP, Clary Hutson, CNP,  Fiorella Francisco, [facility], Julian Mendez, CNP, Moses Christina, CNP, Sarita Madison, CNP, Kg Cooper, CNP, Alicia Shirley, CNP, Lauren Lcuero PAJAYME, Mel[address], [facility], Kaelyn Ramos, CNP, Ana Goff, [address] [place] [address]    Progress Note    7/10/2023    11:34 AM    Name:   Raymondo Schwab  MRN:     [id number]     Acct:      [de-identified]   Room:   [address] [place] [address] Day:  2  Admit Date:  7/8/2023  4:13 PM    PCP:   Brad Davila MD  Code Status:  DNR-CCA    Subjective:     Patient seen in follow-up for failure to thrive/dehydration secondary to advanced dementia, patient states \"I am not hungry\"    Patient seen earlier today around breakfast time. He is refusing to eat and not taking his medications. His daughter presented to the hospital later this afternoon and I had a long discussion with her regarding goals of care. She tells me that her father has told her that he does not want any heroics and he is ready to die. He is not interested in a feeding tube. We discussed consulting hospice and de-escalating care. She does feel that this is within his wishes and I subsequently am going to transition him to a DNR CC with consultation to hospice.   In the event that he is inpatient appropriate he can be

## 2023-07-10 NOTE — CARE COORDINATION
Discharge planning     Patient admitted from [facility]. Recent admission 6/13-6/19 for dehydration. Patient has history of aphasia. Patient admitted with acute on chronic kidney failure due to poor oral intake. Family will not pursue feeding tube. Palliative care consult and hospice being considered. Will update ss.

## 2023-07-10 NOTE — DISCHARGE SUMMARY
[facility]  Office: [address], DO, Savannah [place], DO, Holland Me, DO, Oralee Fonder Blood, DO, Dunia Mancera MD, Xuan Xie MD, Mariela Spencer MD, David Lawson MD,  Yuliet Verma MD, Kristel Mead MD, Roseline Vu, DO, Shanta Manzano MD,  Andrea Torres MD, Vijaya Parson MD, Kandice Macias DO, Carol Cheng MD,  Rosalind Harrison DO, Lamont Pedroza MD, Johnathan Morales MD, Deepti Nixon MD, Hiwot López MD,  Monet Tatum MD, Sana Peace MD, Bhavik Stern DO, Brooks Sarah MD,  Anaid Kc MD, Coni Amanda, Paula Godinez, CNP, Zoie Cifuentes, CNP, Kurt Louie, CNP,  Ramirez Scott, [facility], Mohit Bray, CNP, Rafi Sawyer, CNP, Roma Workman, CNP, Osorio Cochran, CNP, Dave Burris, CNP, Clwhitley Malagon, PA-C, Francisco Carver, CNS, Gwendolyn Maxwell, CNP, Alexandra Olivas, [place] [id number] [facility]    Discharge Summary     Patient ID: Risa Murphy  :  1935   MRN: [id number]     ACCOUNT:  [de-identified]   Patient's PCP: Vinicius Parada MD  Admit Date: 2023   Discharge Date: 7/10/2023  Length of Stay: 2  Code Status:  DNR-CC  Admitting Physician: Bubba Nixon DO  Discharge Physician: Bubba Nixon DO     Active Discharge Diagnoses:     Hospital Problem Lists:  Principal Problem:    Hypercalcemia  Resolved Problems:    * No resolved hospital problems. *      Admission Condition:  fair     Discharged Condition: Terminal    Hospital Stay:     Hospital Course:  Risa Murphy is a 81 y.o. male who was admitted for the management of Hypercalcemia , presented to ER with Other (Abnormal labs)    This very pleasant but unfortunate 81-year-old male with advanced dementia was brought to the hospital due to abnormal labs. This is a second admission to our facility due to acute on chronic kidney failure secondary to dehydration due to poor oral intake.   The patient has

## 2023-07-10 NOTE — PLAN OF CARE
Problem: Discharge Planning  Goal: Discharge to home or other facility with appropriate resources  7/10/2023 0242 by Denys Caicedo RN  Outcome: Progressing  Flowsheets (Taken 7/9/2023 2000)  Discharge to home or other facility with appropriate resources: Identify barriers to discharge with patient and caregiver  7/9/2023 1717 by Sarah Andujar RN  Outcome: Progressing     Problem: Safety - Adult  Goal: Free from fall injury  7/10/2023 0242 by Denys Caicedo RN  Outcome: Progressing  7/9/2023 1717 by Sarah Andujar RN  Outcome: Progressing     Problem: Skin/Tissue Integrity  Goal: Absence of new skin breakdown  Description: 1. Monitor for areas of redness and/or skin breakdown  2. Assess vascular access sites hourly  3. Every 4-6 hours minimum:  Change oxygen saturation probe site  4. Every 4-6 hours:  If on nasal continuous positive airway pressure, respiratory therapy assess nares and determine need for appliance change or resting period.   7/10/2023 0242 by Denys Caicedo RN  Outcome: Progressing  7/9/2023 1717 by Sarah Andujar RN  Outcome: Progressing     Problem: ABCDS Injury Assessment  Goal: Absence of physical injury  7/10/2023 0242 by Denys Caicedo RN  Outcome: Progressing  7/9/2023 1717 by Sarah Andujar RN  Outcome: Progressing     Problem: Chronic Conditions and Co-morbidities  Goal: Patient's chronic conditions and co-morbidity symptoms are monitored and maintained or improved  7/10/2023 0242 by Denys Caicedo RN  Outcome: Progressing  Flowsheets (Taken 7/9/2023 2000)  Care Plan - Patient's Chronic Conditions and Co-Morbidity Symptoms are Monitored and Maintained or Improved: Monitor and assess patient's chronic conditions and comorbid symptoms for stability, deterioration, or improvement  7/9/2023 1717 by Sarah Andujar RN  Outcome: Progressing

## 2023-07-10 NOTE — PROGRESS NOTES
[facility] NOTE    Room # 243/261-22   Name: Verlinda Nageotte            Gnosticism: [address]      Reason for visit:  nurse referred     I visited the patient. Admit Date & Time: 7/8/2023  4:13 PM    Assessment:  Verlinda Nageotte is a 81 y.o. male in the hospital because \"hypercalcemia\". Upon entering the room pt was lying in bed. Patient did not seem to be alert and oriented. Patient spoke to this writer in soft voice. Patient's speech was unintelligible. Intervention:  I introduced myself and my title as  I offered space for pt  to express feelings, needs, and concerns and provided a ministry presence. This writer actively listened to patient. This writer offered a brief silent prayer for pt. Outcome:  Patient did not respond to this writer. Patient appeared to be peaceful and at rest.     Plan:  Thiagobrandi Ortiz will remain available to offer spiritual and emotional support as needed. Electronically signed by Carlee Trevizo on 7/10/2023 at 10:14 AM.  [address] [place] [address] Drive -        07/10/23 1011   Encounter Summary   Service Provided For: Patient   Referral/Consult From: Nurse   Support System Spouse; Children;Family members   Last Encounter  07/10/23   Complexity of Encounter Low   Begin Time 1008   End Time  1013   Total Time Calculated 5 min   Encounter    Type Initial Screen/Assessment   Spiritual/Emotional needs   Type Spiritual Support   Assessment/Intervention/Outcome   Assessment Calm;Passive; Impaired social interaction; Impaired resilience   Intervention Active listening;Sustaining Presence/Ministry of presence;Prayer (assurance of)/[place]   Outcome Comfort;Peace

## 2023-07-10 NOTE — PROGRESS NOTES
Nutrition Note    PNS received d/t poor intake and weight loss. Per rounds, pt is refusing to eat or drink anything and has declined feeding tube. Hospice has been consulted, ONS initiated upon admission. Will defer nutrition assessment at this time and continue to follow and offer nutritional intervention(s) as appropriate.      Electronically signed by Amanda Cardenas RD on 7/10/23 at 11:53 AM EDT    OSCAR Villanueva, CIERRAN, LD  Registered [address] [place] [address]  [phone]

## (undated) DEVICE — BIPOLAR ELECTROHEMOSTASIS CATHETER: Brand: INJECTION GOLD PROBE

## (undated) DEVICE — Device: Brand: DEFENDO VALVE AND CONNECTOR KIT

## (undated) DEVICE — 4-PORT MANIFOLD: Brand: NEPTUNE 2

## (undated) DEVICE — PERRYSBURG ENDO PACK: Brand: MEDLINE INDUSTRIES, INC.

## (undated) DEVICE — BITEBLOCK 54FR W/ DENT RIM BLOX